# Patient Record
Sex: MALE | Race: WHITE | NOT HISPANIC OR LATINO
[De-identification: names, ages, dates, MRNs, and addresses within clinical notes are randomized per-mention and may not be internally consistent; named-entity substitution may affect disease eponyms.]

---

## 2021-08-18 ENCOUNTER — TRANSCRIPTION ENCOUNTER (OUTPATIENT)
Age: 31
End: 2021-08-18

## 2021-08-18 ENCOUNTER — INPATIENT (INPATIENT)
Facility: HOSPITAL | Age: 31
LOS: 5 days | Discharge: HOME | DRG: 419 | End: 2021-08-24
Attending: INTERNAL MEDICINE | Admitting: INTERNAL MEDICINE
Payer: COMMERCIAL

## 2021-08-18 VITALS
WEIGHT: 184.97 LBS | RESPIRATION RATE: 18 BRPM | DIASTOLIC BLOOD PRESSURE: 91 MMHG | HEIGHT: 69 IN | TEMPERATURE: 98 F | SYSTOLIC BLOOD PRESSURE: 133 MMHG | OXYGEN SATURATION: 97 % | HEART RATE: 51 BPM

## 2021-08-18 DIAGNOSIS — Z29.9 ENCOUNTER FOR PROPHYLACTIC MEASURES, UNSPECIFIED: ICD-10-CM

## 2021-08-18 DIAGNOSIS — S83.512A SPRAIN OF ANTERIOR CRUCIATE LIGAMENT OF LEFT KNEE, INITIAL ENCOUNTER: Chronic | ICD-10-CM

## 2021-08-18 DIAGNOSIS — K80.50 CALCULUS OF BILE DUCT WITHOUT CHOLANGITIS OR CHOLECYSTITIS WITHOUT OBSTRUCTION: ICD-10-CM

## 2021-08-18 DIAGNOSIS — E03.9 HYPOTHYROIDISM, UNSPECIFIED: ICD-10-CM

## 2021-08-18 LAB
ALBUMIN SERPL ELPH-MCNC: 4.1 G/DL — SIGNIFICANT CHANGE UP (ref 3.3–5)
ALP SERPL-CCNC: 168 U/L — HIGH (ref 40–120)
ALT FLD-CCNC: 867 U/L — HIGH (ref 12–78)
ANION GAP SERPL CALC-SCNC: 4 MMOL/L — LOW (ref 5–17)
APAP SERPL-MCNC: 2 UG/ML — LOW (ref 10–30)
APPEARANCE UR: ABNORMAL
APTT BLD: 33.5 SEC — SIGNIFICANT CHANGE UP (ref 27.5–35.5)
AST SERPL-CCNC: 374 U/L — HIGH (ref 15–37)
BASOPHILS # BLD AUTO: 0.03 K/UL — SIGNIFICANT CHANGE UP (ref 0–0.2)
BASOPHILS NFR BLD AUTO: 0.4 % — SIGNIFICANT CHANGE UP (ref 0–2)
BILIRUB DIRECT SERPL-MCNC: 3.4 MG/DL — HIGH (ref 0.05–0.2)
BILIRUB INDIRECT FLD-MCNC: 6.8 MG/DL — HIGH (ref 0.2–1)
BILIRUB SERPL-MCNC: 10.2 MG/DL — HIGH (ref 0.2–1.2)
BILIRUB SERPL-MCNC: 10.2 MG/DL — HIGH (ref 0.2–1.2)
BILIRUB UR-MCNC: ABNORMAL
BUN SERPL-MCNC: 12 MG/DL — SIGNIFICANT CHANGE UP (ref 7–23)
CALCIUM SERPL-MCNC: 9 MG/DL — SIGNIFICANT CHANGE UP (ref 8.5–10.1)
CHLORIDE SERPL-SCNC: 106 MMOL/L — SIGNIFICANT CHANGE UP (ref 96–108)
CO2 SERPL-SCNC: 29 MMOL/L — SIGNIFICANT CHANGE UP (ref 22–31)
COLOR SPEC: YELLOW — SIGNIFICANT CHANGE UP
CREAT SERPL-MCNC: 1.1 MG/DL — SIGNIFICANT CHANGE UP (ref 0.5–1.3)
DIFF PNL FLD: ABNORMAL
EOSINOPHIL # BLD AUTO: 0.27 K/UL — SIGNIFICANT CHANGE UP (ref 0–0.5)
EOSINOPHIL NFR BLD AUTO: 3.7 % — SIGNIFICANT CHANGE UP (ref 0–6)
ETHANOL SERPL-MCNC: <10 MG/DL — SIGNIFICANT CHANGE UP (ref 0–10)
GLUCOSE SERPL-MCNC: 95 MG/DL — SIGNIFICANT CHANGE UP (ref 70–99)
GLUCOSE UR QL: NEGATIVE — SIGNIFICANT CHANGE UP
HCT VFR BLD CALC: 46.2 % — SIGNIFICANT CHANGE UP (ref 39–50)
HGB BLD-MCNC: 15.7 G/DL — SIGNIFICANT CHANGE UP (ref 13–17)
IMM GRANULOCYTES NFR BLD AUTO: 0.4 % — SIGNIFICANT CHANGE UP (ref 0–1.5)
INR BLD: 1.23 RATIO — HIGH (ref 0.88–1.16)
KETONES UR-MCNC: ABNORMAL
LEUKOCYTE ESTERASE UR-ACNC: ABNORMAL
LIDOCAIN IGE QN: 86 U/L — SIGNIFICANT CHANGE UP (ref 73–393)
LYMPHOCYTES # BLD AUTO: 0.79 K/UL — LOW (ref 1–3.3)
LYMPHOCYTES # BLD AUTO: 10.7 % — LOW (ref 13–44)
MCHC RBC-ENTMCNC: 29.8 PG — SIGNIFICANT CHANGE UP (ref 27–34)
MCHC RBC-ENTMCNC: 34 GM/DL — SIGNIFICANT CHANGE UP (ref 32–36)
MCV RBC AUTO: 87.8 FL — SIGNIFICANT CHANGE UP (ref 80–100)
MONOCYTES # BLD AUTO: 0.63 K/UL — SIGNIFICANT CHANGE UP (ref 0–0.9)
MONOCYTES NFR BLD AUTO: 8.6 % — SIGNIFICANT CHANGE UP (ref 2–14)
NEUTROPHILS # BLD AUTO: 5.6 K/UL — SIGNIFICANT CHANGE UP (ref 1.8–7.4)
NEUTROPHILS NFR BLD AUTO: 76.2 % — SIGNIFICANT CHANGE UP (ref 43–77)
NITRITE UR-MCNC: POSITIVE
NRBC # BLD: 0 /100 WBCS — SIGNIFICANT CHANGE UP (ref 0–0)
PH UR: 6 — SIGNIFICANT CHANGE UP (ref 5–8)
PLATELET # BLD AUTO: 199 K/UL — SIGNIFICANT CHANGE UP (ref 150–400)
POTASSIUM SERPL-MCNC: 4.1 MMOL/L — SIGNIFICANT CHANGE UP (ref 3.5–5.3)
POTASSIUM SERPL-SCNC: 4.1 MMOL/L — SIGNIFICANT CHANGE UP (ref 3.5–5.3)
PROT SERPL-MCNC: 7.5 G/DL — SIGNIFICANT CHANGE UP (ref 6–8.3)
PROT UR-MCNC: 15
PROTHROM AB SERPL-ACNC: 14.2 SEC — HIGH (ref 10.6–13.6)
RBC # BLD: 5.26 M/UL — SIGNIFICANT CHANGE UP (ref 4.2–5.8)
RBC # FLD: 12.5 % — SIGNIFICANT CHANGE UP (ref 10.3–14.5)
SARS-COV-2 RNA SPEC QL NAA+PROBE: SIGNIFICANT CHANGE UP
SODIUM SERPL-SCNC: 139 MMOL/L — SIGNIFICANT CHANGE UP (ref 135–145)
SP GR SPEC: 1.02 — SIGNIFICANT CHANGE UP (ref 1.01–1.02)
UROBILINOGEN FLD QL: 8
WBC # BLD: 7.35 K/UL — SIGNIFICANT CHANGE UP (ref 3.8–10.5)
WBC # FLD AUTO: 7.35 K/UL — SIGNIFICANT CHANGE UP (ref 3.8–10.5)

## 2021-08-18 PROCEDURE — 99223 1ST HOSP IP/OBS HIGH 75: CPT | Mod: GC

## 2021-08-18 PROCEDURE — 93010 ELECTROCARDIOGRAM REPORT: CPT

## 2021-08-18 PROCEDURE — 74177 CT ABD & PELVIS W/CONTRAST: CPT | Mod: 26,MA

## 2021-08-18 PROCEDURE — 99222 1ST HOSP IP/OBS MODERATE 55: CPT

## 2021-08-18 PROCEDURE — 99285 EMERGENCY DEPT VISIT HI MDM: CPT

## 2021-08-18 RX ORDER — PIPERACILLIN AND TAZOBACTAM 4; .5 G/20ML; G/20ML
3.38 INJECTION, POWDER, LYOPHILIZED, FOR SOLUTION INTRAVENOUS ONCE
Refills: 0 | Status: DISCONTINUED | OUTPATIENT
Start: 2021-08-18 | End: 2021-08-18

## 2021-08-18 RX ORDER — ONDANSETRON 8 MG/1
4 TABLET, FILM COATED ORAL ONCE
Refills: 0 | Status: COMPLETED | OUTPATIENT
Start: 2021-08-18 | End: 2021-08-18

## 2021-08-18 RX ORDER — SODIUM CHLORIDE 9 MG/ML
1000 INJECTION INTRAMUSCULAR; INTRAVENOUS; SUBCUTANEOUS
Refills: 0 | Status: DISCONTINUED | OUTPATIENT
Start: 2021-08-18 | End: 2021-08-20

## 2021-08-18 RX ORDER — LEVOTHYROXINE SODIUM 125 MCG
112.5 TABLET ORAL DAILY
Refills: 0 | Status: DISCONTINUED | OUTPATIENT
Start: 2021-08-18 | End: 2021-08-23

## 2021-08-18 RX ORDER — LANOLIN ALCOHOL/MO/W.PET/CERES
3 CREAM (GRAM) TOPICAL AT BEDTIME
Refills: 0 | Status: DISCONTINUED | OUTPATIENT
Start: 2021-08-18 | End: 2021-08-23

## 2021-08-18 RX ORDER — ONDANSETRON 8 MG/1
4 TABLET, FILM COATED ORAL EVERY 8 HOURS
Refills: 0 | Status: DISCONTINUED | OUTPATIENT
Start: 2021-08-18 | End: 2021-08-23

## 2021-08-18 RX ORDER — MORPHINE SULFATE 50 MG/1
2 CAPSULE, EXTENDED RELEASE ORAL EVERY 4 HOURS
Refills: 0 | Status: DISCONTINUED | OUTPATIENT
Start: 2021-08-18 | End: 2021-08-23

## 2021-08-18 RX ORDER — PIPERACILLIN AND TAZOBACTAM 4; .5 G/20ML; G/20ML
3.38 INJECTION, POWDER, LYOPHILIZED, FOR SOLUTION INTRAVENOUS EVERY 8 HOURS
Refills: 0 | Status: DISCONTINUED | OUTPATIENT
Start: 2021-08-19 | End: 2021-08-23

## 2021-08-18 RX ORDER — MORPHINE SULFATE 50 MG/1
4 CAPSULE, EXTENDED RELEASE ORAL ONCE
Refills: 0 | Status: DISCONTINUED | OUTPATIENT
Start: 2021-08-18 | End: 2021-08-18

## 2021-08-18 RX ORDER — PIPERACILLIN AND TAZOBACTAM 4; .5 G/20ML; G/20ML
3.38 INJECTION, POWDER, LYOPHILIZED, FOR SOLUTION INTRAVENOUS ONCE
Refills: 0 | Status: COMPLETED | OUTPATIENT
Start: 2021-08-18 | End: 2021-08-18

## 2021-08-18 RX ORDER — ACETAMINOPHEN 500 MG
650 TABLET ORAL EVERY 6 HOURS
Refills: 0 | Status: DISCONTINUED | OUTPATIENT
Start: 2021-08-18 | End: 2021-08-18

## 2021-08-18 RX ORDER — SODIUM CHLORIDE 9 MG/ML
1000 INJECTION INTRAMUSCULAR; INTRAVENOUS; SUBCUTANEOUS ONCE
Refills: 0 | Status: COMPLETED | OUTPATIENT
Start: 2021-08-18 | End: 2021-08-18

## 2021-08-18 RX ORDER — LEVOTHYROXINE SODIUM 125 MCG
1.5 TABLET ORAL
Qty: 0 | Refills: 0 | DISCHARGE

## 2021-08-18 RX ADMIN — PIPERACILLIN AND TAZOBACTAM 25 GRAM(S): 4; .5 INJECTION, POWDER, LYOPHILIZED, FOR SOLUTION INTRAVENOUS at 23:48

## 2021-08-18 RX ADMIN — ONDANSETRON 4 MILLIGRAM(S): 8 TABLET, FILM COATED ORAL at 15:44

## 2021-08-18 RX ADMIN — MORPHINE SULFATE 2 MILLIGRAM(S): 50 CAPSULE, EXTENDED RELEASE ORAL at 21:25

## 2021-08-18 RX ADMIN — MORPHINE SULFATE 4 MILLIGRAM(S): 50 CAPSULE, EXTENDED RELEASE ORAL at 15:44

## 2021-08-18 RX ADMIN — MORPHINE SULFATE 4 MILLIGRAM(S): 50 CAPSULE, EXTENDED RELEASE ORAL at 16:28

## 2021-08-18 RX ADMIN — MORPHINE SULFATE 2 MILLIGRAM(S): 50 CAPSULE, EXTENDED RELEASE ORAL at 21:16

## 2021-08-18 RX ADMIN — SODIUM CHLORIDE 1000 MILLILITER(S): 9 INJECTION INTRAMUSCULAR; INTRAVENOUS; SUBCUTANEOUS at 15:44

## 2021-08-18 RX ADMIN — SODIUM CHLORIDE 125 MILLILITER(S): 9 INJECTION INTRAMUSCULAR; INTRAVENOUS; SUBCUTANEOUS at 21:25

## 2021-08-18 RX ADMIN — PIPERACILLIN AND TAZOBACTAM 3.38 GRAM(S): 4; .5 INJECTION, POWDER, LYOPHILIZED, FOR SOLUTION INTRAVENOUS at 19:25

## 2021-08-18 RX ADMIN — Medication 3 MILLIGRAM(S): at 23:48

## 2021-08-18 RX ADMIN — SODIUM CHLORIDE 1000 MILLILITER(S): 9 INJECTION INTRAMUSCULAR; INTRAVENOUS; SUBCUTANEOUS at 16:44

## 2021-08-18 RX ADMIN — PIPERACILLIN AND TAZOBACTAM 200 GRAM(S): 4; .5 INJECTION, POWDER, LYOPHILIZED, FOR SOLUTION INTRAVENOUS at 18:55

## 2021-08-18 NOTE — H&P ADULT - HISTORY OF PRESENT ILLNESS
Patient is a 32 year old male with PMH of hypothyroidism who presents to the ED with abdominal pain. Patient describes pain as colicky epigastric pain associated with eating greasy foods. Pain originally started 2 months ago and has been present on and off since then. He believed symptoms were due to gas and was taking gas-x for relief. Since Sunday, pain has been radiating to his back. For the past 2 days pain has been progressively worsening.  This morning mother noticed patient looked very jaundiced and brought him to ED for evaluation. He denies any fever, chills, cp, dyspnea, leg pain/swelling. Of note he noticed his urine was much darker yesterday but denies any frequency or dysuria.     In the ED  VS:T98, Hr 51, /91, RR 18, 97% SPO2 on RA  Labs: CBC WNL, INR 1.23, Tbili 10.2, Dbili 3.4, Alk phos 168, AST//867. UA: +nitrite, trace LE, few bacteria  CT A/P: Cholelithiasis and choledocholithiasis. Mildly dilated common bile duct.  EKG: Pending  Given in ED: morphine 4mg IVP x1, zofran 4mg x1, zosyn, NS 1L bolus x1 Patient is a 31 year old male with PMH of hypothyroidism who presents to the ED with abdominal pain. Patient describes pain as colicky epigastric pain associated with eating greasy foods. Pain originally started 2 months ago and has been present on and off since then. He believed symptoms were due to gas and was taking gas-x for relief. Since Sunday, pain has been radiating to his back. For the past 2 days pain has been progressively worsening.  This morning mother noticed patient looked very jaundiced and brought him to Urgent care today for evaluation and then referred to ED. He denies any fever, chills, cp, dyspnea, leg pain/swelling. Of note he noticed his urine was much darker yesterday but denies any frequency or dysuria.   No past hx of DM2/HTN/HLD/CAD/CHF.     In the ED  VS:T98, Hr 51, /91, RR 18, 97% SPO2 on RA  Labs: CBC WNL, INR 1.23, Tbili 10.2, Dbili 3.4, Alk phos 168, AST//867. UA: +nitrite, trace LE, few bacteria  CT A/P: Cholelithiasis and choledocholithiasis. Mildly dilated common bile duct.  EKG: Pending  Given in ED: morphine 4mg IVP x1, zofran 4mg x1, zosyn, NS 1L bolus x1

## 2021-08-18 NOTE — ED ADULT NURSE NOTE - OBJECTIVE STATEMENT
Hypothyroidism Received pt alert and orientated. Pt is complaining of back pain and abd pain. Pt is able to move all extremities. Call bell within reach. Freq rounding performed. Will continue to monitor. Safety/Comfort maintained.

## 2021-08-18 NOTE — PHARMACOTHERAPY INTERVENTION NOTE - COMMENTS
Discontinued Zosyn 3.375g STAT order. Pt already received a loading dose @1855.    POLICY TITLE: Duplicate Orders, Discontinuation by Pharmacy (CPOE)

## 2021-08-18 NOTE — H&P ADULT - NSHPPHYSICALEXAM_GEN_ALL_CORE
T(C): 36.7 (08-18-21 @ 14:32), Max: 36.7 (08-18-21 @ 14:32)  HR: 51 (08-18-21 @ 14:32) (51 - 51)  BP: 133/91 (08-18-21 @ 14:32) (133/91 - 133/91)  RR: 18 (08-18-21 @ 14:32) (18 - 18)  SpO2: 97% (08-18-21 @ 14:32) (97% - 97%)    GENERAL: patient appears well, no acute distress, appropriately interactive  EYES: sclera jaundiced, no exudates  LUNGS: good air entry bilaterally, clear to auscultation, symmetric breath sounds, no wheezing or rhonchi appreciated  HEART: soft S1/S2, regular rate and rhythm, no murmurs noted, no lower extremity edema  GASTROINTESTINAL: abdomen is soft, nontender, nondistended, normoactive bowel sounds  INTEGUMENT: jaundice appearing. good skin turgor, warm skin, appears well perfused  MUSCULOSKELETAL: no clubbing or cyanosis, no obvious deformity  NEUROLOGIC: awake, alert, oriented x3, good muscle tone in all 4 extremities  HEME/LYMPH: no obvious ecchymosis or petechiae T(C): 36.7 (08-18-21 @ 14:32), Max: 36.7 (08-18-21 @ 14:32)  HR: 51 (08-18-21 @ 14:32) (51 - 51)  BP: 133/91 (08-18-21 @ 14:32) (133/91 - 133/91)  RR: 18 (08-18-21 @ 14:32) (18 - 18)  SpO2: 97% (08-18-21 @ 14:32) (97% - 97%)    GENERAL: patient appears well, no acute distress, appropriately interactive  EYES: sclera jaundiced, no exudates  LUNGS: good air entry bilaterally, clear to auscultation, symmetric breath sounds, no wheezing or rhonchi appreciated  HEART: soft S1/S2, regular rate and rhythm, no murmurs noted, no lower extremity edema  GASTROINTESTINAL: abdomen is soft, TTP of RUQ, nondistended, normoactive bowel sounds  INTEGUMENT: jaundice appearing. good skin turgor, warm skin, appears well perfused  MUSCULOSKELETAL: no clubbing or cyanosis, no obvious deformity  NEUROLOGIC: awake, alert, oriented x3, good muscle tone in all 4 extremities  HEME/LYMPH: no obvious ecchymosis or petechiae

## 2021-08-18 NOTE — H&P ADULT - ATTENDING COMMENTS
Patient is a 31 year old male with PMH of hypothyroidism who presents to the ED with colicky epigastric pain and jaundice. Found to have cholelithiasis and choledocholithiasis on CT A/P. Started on Zosyn. Plan for ERCP tomorrow with Dr. Mast    HPI as above.     T(C): 36.7 (08-18-21 @ 14:32), Max: 36.7 (08-18-21 @ 14:32)  HR: 51 (08-18-21 @ 14:32) (51 - 51)  BP: 133/91 (08-18-21 @ 14:32) (133/91 - 133/91)  RR: 18 (08-18-21 @ 14:32) (18 - 18)  SpO2: 97% (08-18-21 @ 14:32) (97% - 97%)  Wt(kg): --    Physical Exam:   GENERAL: well-groomed, well-developed, NAD, jaundiced appearance  HEENT: head NC/AT; EOM intact, +scleral icterus; hearing grossly intact, moist mucous membranes  NECK: supple, no JVD  RESPIRATORY: CTA B/L, no wheezing, rales, rhonchi or rubs  CARDIOVASCULAR: S1&S2, RRR, no murmurs or gallops  ABDOMEN: soft, TTP of RUQ, non-distended, + Bowel sounds x4 quadrants, no guarding, rebound or rigidity  MUSCULOSKELETAL:  no clubbing, cyanosis or edema of all 4 extremities  LYMPH: no cervical lymphadenopathy  VASCULAR: Radial pulses 2+ bilaterally, no varicose veins   SKIN: warm and dry, color normal  NEUROLOGIC: AA&O X3, CN2-12 intact w/ no focal deficits, no sensory loss, motor Strength 5/5 in UE & LE B/L  Psych: Normal mood and affect, normal behavior    Plan:   Choledocholithiasis:   -keep NPO. Conitnue IVF and Zosyn  -patient is afebrile and normal WBC count.   -ER discussed with Dr Mast who advised ERCP to be performed tomorrow  -will trend LFT's  -will likely need cholecystectomy later this admission pending surgery rec's  -remainder as above Patient is a 31 year old male with PMH of hypothyroidism who presents to the ED with colicky epigastric pain and jaundice. Found to have cholelithiasis and choledocholithiasis on CT A/P. Started on Zosyn. Plan for ERCP tomorrow with Dr. Mast    HPI as above.     T(C): 36.7 (08-18-21 @ 14:32), Max: 36.7 (08-18-21 @ 14:32)  HR: 51 (08-18-21 @ 14:32) (51 - 51)  BP: 133/91 (08-18-21 @ 14:32) (133/91 - 133/91)  RR: 18 (08-18-21 @ 14:32) (18 - 18)  SpO2: 97% (08-18-21 @ 14:32) (97% - 97%)  Wt(kg): --    Physical Exam:   GENERAL: well-groomed, well-developed, NAD, jaundiced appearance  HEENT: head NC/AT; EOM intact, +scleral icterus; hearing grossly intact, moist mucous membranes  NECK: supple, no JVD  RESPIRATORY: CTA B/L, no wheezing, rales, rhonchi or rubs  CARDIOVASCULAR: S1&S2, RRR, no murmurs or gallops  ABDOMEN: soft, TTP of RUQ, non-distended, + Bowel sounds x4 quadrants, no guarding, rebound or rigidity  MUSCULOSKELETAL:  no clubbing, cyanosis or edema of all 4 extremities  LYMPH: no cervical lymphadenopathy  VASCULAR: Radial pulses 2+ bilaterally, no varicose veins   SKIN: warm and dry, color normal  NEUROLOGIC: AA&O X3, CN2-12 intact w/ no focal deficits, no sensory loss, motor Strength 5/5 in UE & LE B/L  Psych: Normal mood and affect, normal behavior    Plan:   Choledocholithiasis:   -keep NPO. Conitnue IVF and Zosyn  -patient is afebrile and normal WBC count.   -ER discussed with Dr Mast who advised ERCP to be performed tomorrow  -Transaminitis: 2/2 to CBD stone, obstruction. will trend LFT's  -will likely need cholecystectomy later this admission pending surgery rec's  -remainder as above

## 2021-08-18 NOTE — H&P ADULT - PROBLEM SELECTOR PLAN 1
- Pt presenting with colicky epigastric pain associated with greasy foods, jaundice.   - CT A/P: Cholelithiasis and choledocholithiasis. Mildly dilated common bile duct.  - s/p morphine 4mg IVP x1, zofran 4mg x1, zosyn, NS 1L bolus x1 in ED  - Continue IV zosyn  - NPO  - start NS at 125cc/hr  - pain control with morphine 2mg IVP q4hrs for moderate pain  - continue zofran PRN for nausea  - MRCP ordered, f/u results  - GI Dr. Mast consulted, plan for ERCP tomorrow - Pt presenting with colicky epigastric pain associated with greasy foods, jaundice.   - CT A/P: Cholelithiasis and choledocholithiasis. Mildly dilated common bile duct.  - s/p morphine 4mg IVP x1, zofran 4mg x1, zosyn, NS 1L bolus x1 in ED  - Continue IV zosyn  - trend LFT's  - NPO  - start NS at 125cc/hr  - pain control with morphine 2mg IVP q4hrs for moderate pain  - continue zofran PRN for nausea  - MRCP ordered, f/u results  - GI Dr. Mast consulted, plan for ERCP tomorrow

## 2021-08-18 NOTE — ED PROVIDER NOTE - PROGRESS NOTE DETAILS
spoke with Dr. Hinojosa gi advised ercp tomorrow  spoke with surgical pa will come to see pt   given [facility] admission

## 2021-08-18 NOTE — ED PROVIDER NOTE - ATTENDING CONTRIBUTION TO CARE
30yo M presents with jaundice, pt states he has been havign intermittent abd pain x months, worsening in last few days, associate with nausea, last 2 days noted darkening of urine and jaundice ans scleral icterus,   as well as back pain, no fevers, no chills, no daily etoh use, no ivdu  will check labs, bili, CT ro obstructive pathology   admit

## 2021-08-18 NOTE — ED ADULT TRIAGE NOTE - CHIEF COMPLAINT QUOTE
patient came in ED with c/o bilateral flank pain X yesterday and orange colored urine X yesterday with nausea and vomiting.

## 2021-08-18 NOTE — H&P ADULT - NSHPREVIEWOFSYSTEMS_GEN_ALL_CORE
CONSTITUTIONAL: No weakness, fevers or chills  EYES/ENT: No visual changes;  No vertigo or throat pain   NECK: No pain or stiffness  RESPIRATORY: No cough, wheezing, hemoptysis; No shortness of breath  CARDIOVASCULAR: No chest pain or palpitations  GASTROINTESTINAL: Admits to epigastric pain that radiates to back. No nausea, vomiting, or hematemesis; No diarrhea or constipation. No melena or hematochezia.  GENITOURINARY: No dysuria, frequency or hematuria  NEUROLOGICAL: No numbness or weakness  SKIN: No itching, burning, rashes, or lesions   All other review of systems is negative unless indicated above.

## 2021-08-18 NOTE — H&P ADULT - NSHPSOCIALHISTORY_GEN_ALL_CORE
Lives at home with mother and father. Denies smoking, eoth use. Endorses occasional marijuana smoking. Not sexually active. No hx of STI's. Lives at home with mother and father. Denies smoking. Only social eoth use one time per month. Endorses occasional marijuana smoking. Not sexually active. No hx of STI's.

## 2021-08-18 NOTE — ED ADULT NURSE REASSESSMENT NOTE - NS ED NURSE REASSESS COMMENT FT1
Report received from CAMPBELL ANDERSON.  Patient is pending COVID swab result and room assignment in hospital.

## 2021-08-18 NOTE — H&P ADULT - ASSESSMENT
Patient is a 32 year old male with PMH of hypothyroidism who presents to the ED with colicky epigastric pain and jaundice. Found to have cholelithiasis and choledocholithiasis on CT A/P. Started on Zosyn. Plan for ERCP tomorrow with Dr. Mast Patient is a 31 year old male with PMH of hypothyroidism who presents to the ED with colicky epigastric pain and jaundice. Found to have cholelithiasis and choledocholithiasis on CT A/P. Started on Zosyn. Plan for ERCP tomorrow with Dr. Mast

## 2021-08-18 NOTE — ED PROVIDER NOTE - OBJECTIVE STATEMENT
Pt is a 30 yo male with pmhx of hypothyroidism c/o of upper abdominal pain and vomiting 7 times since yesterday. Pt had chills yesterday back pain no diarrhea. Pt states has been having "indigestion" for about one month worse after he eats heavy foods. Mother noted eyes and skin yellow and brought pt to er. Pt states he is social drinker no other drugs. Pt also noted     pcp Mercedes Pt is a 30 yo male with pmhx of hypothyroidism c/o of upper abdominal pain and vomiting 7 times since yesterday. Pt had chills yesterday back pain no diarrhea. Pt states has been having "indigestion" for about one month worse after he eats heavy foods. Mother noted eyes and skin yellow and brought pt to er. Pt states he is social drinker no other drugs.     pcp Mercedes

## 2021-08-19 LAB
ALBUMIN SERPL ELPH-MCNC: 3.4 G/DL — SIGNIFICANT CHANGE UP (ref 3.3–5)
ALP SERPL-CCNC: 153 U/L — HIGH (ref 40–120)
ALT FLD-CCNC: 603 U/L — HIGH (ref 12–78)
ANION GAP SERPL CALC-SCNC: 7 MMOL/L — SIGNIFICANT CHANGE UP (ref 5–17)
AST SERPL-CCNC: 197 U/L — HIGH (ref 15–37)
BASOPHILS # BLD AUTO: 0.03 K/UL — SIGNIFICANT CHANGE UP (ref 0–0.2)
BASOPHILS NFR BLD AUTO: 0.4 % — SIGNIFICANT CHANGE UP (ref 0–2)
BILIRUB DIRECT SERPL-MCNC: 3.1 MG/DL — HIGH (ref 0.05–0.2)
BILIRUB INDIRECT FLD-MCNC: 5.6 MG/DL — HIGH (ref 0.2–1)
BILIRUB SERPL-MCNC: 8.7 MG/DL — HIGH (ref 0.2–1.2)
BUN SERPL-MCNC: 10 MG/DL — SIGNIFICANT CHANGE UP (ref 7–23)
CALCIUM SERPL-MCNC: 8.5 MG/DL — SIGNIFICANT CHANGE UP (ref 8.5–10.1)
CHLORIDE SERPL-SCNC: 110 MMOL/L — HIGH (ref 96–108)
CO2 SERPL-SCNC: 25 MMOL/L — SIGNIFICANT CHANGE UP (ref 22–31)
COVID-19 SPIKE DOMAIN AB INTERP: POSITIVE
COVID-19 SPIKE DOMAIN ANTIBODY RESULT: >250 U/ML — HIGH
CREAT SERPL-MCNC: 1 MG/DL — SIGNIFICANT CHANGE UP (ref 0.5–1.3)
EOSINOPHIL # BLD AUTO: 0.39 K/UL — SIGNIFICANT CHANGE UP (ref 0–0.5)
EOSINOPHIL NFR BLD AUTO: 5.2 % — SIGNIFICANT CHANGE UP (ref 0–6)
GLUCOSE SERPL-MCNC: 71 MG/DL — SIGNIFICANT CHANGE UP (ref 70–99)
HAV IGM SER-ACNC: SIGNIFICANT CHANGE UP
HBV CORE IGM SER-ACNC: SIGNIFICANT CHANGE UP
HBV SURFACE AG SER-ACNC: SIGNIFICANT CHANGE UP
HCT VFR BLD CALC: 40.8 % — SIGNIFICANT CHANGE UP (ref 39–50)
HCV AB S/CO SERPL IA: 0.09 S/CO — SIGNIFICANT CHANGE UP (ref 0–0.99)
HCV AB SERPL-IMP: SIGNIFICANT CHANGE UP
HGB BLD-MCNC: 13.9 G/DL — SIGNIFICANT CHANGE UP (ref 13–17)
IMM GRANULOCYTES NFR BLD AUTO: 0.5 % — SIGNIFICANT CHANGE UP (ref 0–1.5)
LYMPHOCYTES # BLD AUTO: 1.01 K/UL — SIGNIFICANT CHANGE UP (ref 1–3.3)
LYMPHOCYTES # BLD AUTO: 13.4 % — SIGNIFICANT CHANGE UP (ref 13–44)
MCHC RBC-ENTMCNC: 30.2 PG — SIGNIFICANT CHANGE UP (ref 27–34)
MCHC RBC-ENTMCNC: 34.1 GM/DL — SIGNIFICANT CHANGE UP (ref 32–36)
MCV RBC AUTO: 88.5 FL — SIGNIFICANT CHANGE UP (ref 80–100)
MONOCYTES # BLD AUTO: 0.5 K/UL — SIGNIFICANT CHANGE UP (ref 0–0.9)
MONOCYTES NFR BLD AUTO: 6.6 % — SIGNIFICANT CHANGE UP (ref 2–14)
NEUTROPHILS # BLD AUTO: 5.57 K/UL — SIGNIFICANT CHANGE UP (ref 1.8–7.4)
NEUTROPHILS NFR BLD AUTO: 73.9 % — SIGNIFICANT CHANGE UP (ref 43–77)
NRBC # BLD: 0 /100 WBCS — SIGNIFICANT CHANGE UP (ref 0–0)
PLATELET # BLD AUTO: 164 K/UL — SIGNIFICANT CHANGE UP (ref 150–400)
POTASSIUM SERPL-MCNC: 3.8 MMOL/L — SIGNIFICANT CHANGE UP (ref 3.5–5.3)
POTASSIUM SERPL-SCNC: 3.8 MMOL/L — SIGNIFICANT CHANGE UP (ref 3.5–5.3)
PROT SERPL-MCNC: 6.6 G/DL — SIGNIFICANT CHANGE UP (ref 6–8.3)
RBC # BLD: 4.61 M/UL — SIGNIFICANT CHANGE UP (ref 4.2–5.8)
RBC # FLD: 12.4 % — SIGNIFICANT CHANGE UP (ref 10.3–14.5)
SARS-COV-2 IGG+IGM SERPL QL IA: >250 U/ML — HIGH
SARS-COV-2 IGG+IGM SERPL QL IA: POSITIVE
SODIUM SERPL-SCNC: 142 MMOL/L — SIGNIFICANT CHANGE UP (ref 135–145)
WBC # BLD: 7.54 K/UL — SIGNIFICANT CHANGE UP (ref 3.8–10.5)
WBC # FLD AUTO: 7.54 K/UL — SIGNIFICANT CHANGE UP (ref 3.8–10.5)

## 2021-08-19 PROCEDURE — 99233 SBSQ HOSP IP/OBS HIGH 50: CPT

## 2021-08-19 PROCEDURE — 74181 MRI ABDOMEN W/O CONTRAST: CPT | Mod: 26

## 2021-08-19 PROCEDURE — 99232 SBSQ HOSP IP/OBS MODERATE 35: CPT

## 2021-08-19 RX ORDER — INDOMETHACIN 50 MG
100 CAPSULE ORAL ONCE
Refills: 0 | Status: COMPLETED | OUTPATIENT
Start: 2021-08-19 | End: 2021-08-19

## 2021-08-19 RX ORDER — IBUPROFEN 200 MG
400 TABLET ORAL ONCE
Refills: 0 | Status: DISCONTINUED | OUTPATIENT
Start: 2021-08-19 | End: 2021-08-19

## 2021-08-19 RX ORDER — ONDANSETRON 8 MG/1
4 TABLET, FILM COATED ORAL ONCE
Refills: 0 | Status: DISCONTINUED | OUTPATIENT
Start: 2021-08-19 | End: 2021-08-23

## 2021-08-19 RX ORDER — SODIUM CHLORIDE 9 MG/ML
1000 INJECTION, SOLUTION INTRAVENOUS
Refills: 0 | Status: DISCONTINUED | OUTPATIENT
Start: 2021-08-19 | End: 2021-08-19

## 2021-08-19 RX ORDER — BENZOCAINE AND MENTHOL 5; 1 G/100ML; G/100ML
1 LIQUID ORAL THREE TIMES A DAY
Refills: 0 | Status: DISCONTINUED | OUTPATIENT
Start: 2021-08-19 | End: 2021-08-23

## 2021-08-19 RX ORDER — HYDROMORPHONE HYDROCHLORIDE 2 MG/ML
0.5 INJECTION INTRAMUSCULAR; INTRAVENOUS; SUBCUTANEOUS
Refills: 0 | Status: DISCONTINUED | OUTPATIENT
Start: 2021-08-19 | End: 2021-08-23

## 2021-08-19 RX ADMIN — SODIUM CHLORIDE 75 MILLILITER(S): 9 INJECTION, SOLUTION INTRAVENOUS at 13:01

## 2021-08-19 RX ADMIN — MORPHINE SULFATE 2 MILLIGRAM(S): 50 CAPSULE, EXTENDED RELEASE ORAL at 22:29

## 2021-08-19 RX ADMIN — MORPHINE SULFATE 2 MILLIGRAM(S): 50 CAPSULE, EXTENDED RELEASE ORAL at 15:38

## 2021-08-19 RX ADMIN — MORPHINE SULFATE 2 MILLIGRAM(S): 50 CAPSULE, EXTENDED RELEASE ORAL at 14:42

## 2021-08-19 RX ADMIN — MORPHINE SULFATE 2 MILLIGRAM(S): 50 CAPSULE, EXTENDED RELEASE ORAL at 05:29

## 2021-08-19 RX ADMIN — PIPERACILLIN AND TAZOBACTAM 25 GRAM(S): 4; .5 INJECTION, POWDER, LYOPHILIZED, FOR SOLUTION INTRAVENOUS at 08:13

## 2021-08-19 RX ADMIN — PIPERACILLIN AND TAZOBACTAM 25 GRAM(S): 4; .5 INJECTION, POWDER, LYOPHILIZED, FOR SOLUTION INTRAVENOUS at 14:34

## 2021-08-19 RX ADMIN — PIPERACILLIN AND TAZOBACTAM 25 GRAM(S): 4; .5 INJECTION, POWDER, LYOPHILIZED, FOR SOLUTION INTRAVENOUS at 21:46

## 2021-08-19 RX ADMIN — SODIUM CHLORIDE 125 MILLILITER(S): 9 INJECTION INTRAMUSCULAR; INTRAVENOUS; SUBCUTANEOUS at 18:43

## 2021-08-19 RX ADMIN — Medication 112.5 MICROGRAM(S): at 05:21

## 2021-08-19 RX ADMIN — MORPHINE SULFATE 2 MILLIGRAM(S): 50 CAPSULE, EXTENDED RELEASE ORAL at 06:42

## 2021-08-19 RX ADMIN — MORPHINE SULFATE 2 MILLIGRAM(S): 50 CAPSULE, EXTENDED RELEASE ORAL at 23:00

## 2021-08-19 NOTE — PROGRESS NOTE ADULT - PROBLEM SELECTOR PLAN 1
+ U/A- possible send Urine culture- clean catch  MRCP to be performed today, will follow up  Continue Antibiotics  Continue ambulation, anti emetics prn, pain medication prn  Awaiting results of MRCP for GI recs.   Will discuss with Dr. Kaplan.

## 2021-08-19 NOTE — PROGRESS NOTE ADULT - SUBJECTIVE AND OBJECTIVE BOX
Patient is a 31y old  Male who presents with a chief complaint of Choledocholithiasis (19 Aug 2021 09:15)      INTERVAL HPI/OVERNIGHT EVENTS:    MEDICATIONS  (STANDING):  levothyroxine 112.5 MICROGram(s) Oral daily  piperacillin/tazobactam IVPB.. 3.375 Gram(s) IV Intermittent every 8 hours  sodium chloride 0.9%. 1000 milliLiter(s) (125 mL/Hr) IV Continuous <Continuous>    MEDICATIONS  (PRN):  melatonin 3 milliGRAM(s) Oral at bedtime PRN Insomnia  morphine  - Injectable 2 milliGRAM(s) IV Push every 4 hours PRN Moderate Pain (4 - 6)  ondansetron Injectable 4 milliGRAM(s) IV Push every 8 hours PRN Nausea and/or Vomiting      Allergies    No Known Allergies    Intolerances        REVIEW OF SYSTEMS:  CONSTITUTIONAL: No fever or chills  HEENT:  No headache, no sore throat  RESPIRATORY: No cough, wheezing, or shortness of breath  CARDIOVASCULAR: No chest pain, palpitations, or leg swelling  GASTROINTESTINAL: No abd pain, nausea, vomiting, or diarrhea  GENITOURINARY: No dysuria, frequency, or hematuria  NEUROLOGICAL: no focal weakness or dizziness  MUSCULOSKELETAL: no myalgias     Vital Signs Last 24 Hrs  T(C): 36.8 (19 Aug 2021 10:15), Max: 36.8 (19 Aug 2021 10:09)  T(F): 98.2 (19 Aug 2021 10:15), Max: 98.2 (19 Aug 2021 10:09)  HR: 49 (19 Aug 2021 10:15) (47 - 58)  BP: 91/53 (19 Aug 2021 10:15) (91/53 - 133/91)  BP(mean): --  RR: 16 (19 Aug 2021 10:15) (16 - 18)  SpO2: 96% (19 Aug 2021 10:15) (96% - 97%)    PHYSICAL EXAM:  GENERAL: NAD  HEENT:  EOMI, moist mucous membranes  CHEST/LUNG:  CTA b/l, no rales, wheezes, or rhonchi  HEART:  RRR, S1, S2  ABDOMEN:  BS+, soft, nontender, nondistended  EXTREMITIES: no edema, cyanosis, or calf tenderness  NERVOUS SYSTEM: AA&Ox3    LABS:                        13.9   7.54  )-----------( 164      ( 19 Aug 2021 07:20 )             40.8     CBC Full  -  ( 19 Aug 2021 07:20 )  WBC Count : 7.54 K/uL  Hemoglobin : 13.9 g/dL  Hematocrit : 40.8 %  Platelet Count - Automated : 164 K/uL  Mean Cell Volume : 88.5 fl  Mean Cell Hemoglobin : 30.2 pg  Mean Cell Hemoglobin Concentration : 34.1 gm/dL  Auto Neutrophil # : 5.57 K/uL  Auto Lymphocyte # : 1.01 K/uL  Auto Monocyte # : 0.50 K/uL  Auto Eosinophil # : 0.39 K/uL  Auto Basophil # : 0.03 K/uL  Auto Neutrophil % : 73.9 %  Auto Lymphocyte % : 13.4 %  Auto Monocyte % : 6.6 %  Auto Eosinophil % : 5.2 %  Auto Basophil % : 0.4 %    19 Aug 2021 07:20    142    |  110    |  10     ----------------------------<  71     3.8     |  25     |  1.00     Ca    8.5        19 Aug 2021 07:20    TPro  6.6    /  Alb  3.4    /  TBili  8.7    /  DBili  3.10   /  AST  197    /  ALT  603    /  AlkPhos  153    19 Aug 2021 07:20    PT/INR - ( 18 Aug 2021 15:52 )   PT: 14.2 sec;   INR: 1.23 ratio         PTT - ( 18 Aug 2021 15:52 )  PTT:33.5 sec  Urinalysis Basic - ( 18 Aug 2021 16:27 )    Color: Nahed / Appearance: Slightly Turbid / S.020 / pH: x  Gluc: x / Ketone: Trace  / Bili: Large / Urobili: 8   Blood: x / Protein: 15 / Nitrite: Positive   Leuk Esterase: Trace / RBC: 3-5 /HPF / WBC 3-5   Sq Epi: x / Non Sq Epi: x / Bacteria: Few      CAPILLARY BLOOD GLUCOSE              RADIOLOGY & ADDITIONAL TESTS:    Personally reviewed.     Consultant(s) Notes Reviewed:  [x] YES  [ ] NO    Care Discussed with [x] Consultants  [x] Patient  [ ] Family  [ ]      [ ] Other; RN  DVT ppx   Patient is a 31y old  Male who presents with a chief complaint of Choledocholithiasis (19 Aug 2021 09:15)      INTERVAL HPI/OVERNIGHT EVENTS: Pt seen and examined at bedside. Pt admits mild sore throat and LBP s/p ERCP. no fevers, chills. Tolerating CLD well.     MEDICATIONS  (STANDING):  levothyroxine 112.5 MICROGram(s) Oral daily  piperacillin/tazobactam IVPB.. 3.375 Gram(s) IV Intermittent every 8 hours  sodium chloride 0.9%. 1000 milliLiter(s) (125 mL/Hr) IV Continuous <Continuous>    MEDICATIONS  (PRN):  melatonin 3 milliGRAM(s) Oral at bedtime PRN Insomnia  morphine  - Injectable 2 milliGRAM(s) IV Push every 4 hours PRN Moderate Pain (4 - 6)  ondansetron Injectable 4 milliGRAM(s) IV Push every 8 hours PRN Nausea and/or Vomiting      Allergies    No Known Allergies    Intolerances        REVIEW OF SYSTEMS:  CONSTITUTIONAL: No fever or chills  HEENT:  No headache, no sore throat  RESPIRATORY: No cough, wheezing, or shortness of breath  CARDIOVASCULAR: No chest pain, palpitations, or leg swelling  GASTROINTESTINAL: No abd pain, nausea, vomiting, or diarrhea  GENITOURINARY: No dysuria, frequency, or hematuria  NEUROLOGICAL: no focal weakness or dizziness  MUSCULOSKELETAL: no myalgias     Vital Signs Last 24 Hrs  T(C): 36.8 (19 Aug 2021 10:15), Max: 36.8 (19 Aug 2021 10:09)  T(F): 98.2 (19 Aug 2021 10:15), Max: 98.2 (19 Aug 2021 10:09)  HR: 49 (19 Aug 2021 10:15) (47 - 58)  BP: 91/53 (19 Aug 2021 10:15) (91/53 - 133/91)  BP(mean): --  RR: 16 (19 Aug 2021 10:15) (16 - 18)  SpO2: 96% (19 Aug 2021 10:15) (96% - 97%)    PHYSICAL EXAM:  GENERAL: M in NAD  HEENT:  EOMI, moist mucous membranes  CHEST/LUNG:  CTA b/l, no rales, wheezes, or rhonchi  HEART:  RRR, S1, S2  ABDOMEN:  BS+, soft, nontender, nondistended  EXTREMITIES: no edema, cyanosis, or calf tenderness  NERVOUS SYSTEM: AA&Ox3    LABS:                        13.9   7.54  )-----------( 164      ( 19 Aug 2021 07:20 )             40.8     CBC Full  -  ( 19 Aug 2021 07:20 )  WBC Count : 7.54 K/uL  Hemoglobin : 13.9 g/dL  Hematocrit : 40.8 %  Platelet Count - Automated : 164 K/uL  Mean Cell Volume : 88.5 fl  Mean Cell Hemoglobin : 30.2 pg  Mean Cell Hemoglobin Concentration : 34.1 gm/dL  Auto Neutrophil # : 5.57 K/uL  Auto Lymphocyte # : 1.01 K/uL  Auto Monocyte # : 0.50 K/uL  Auto Eosinophil # : 0.39 K/uL  Auto Basophil # : 0.03 K/uL  Auto Neutrophil % : 73.9 %  Auto Lymphocyte % : 13.4 %  Auto Monocyte % : 6.6 %  Auto Eosinophil % : 5.2 %  Auto Basophil % : 0.4 %    19 Aug 2021 07:20    142    |  110    |  10     ----------------------------<  71     3.8     |  25     |  1.00     Ca    8.5        19 Aug 2021 07:20    TPro  6.6    /  Alb  3.4    /  TBili  8.7    /  DBili  3.10   /  AST  197    /  ALT  603    /  AlkPhos  153    19 Aug 2021 07:20    PT/INR - ( 18 Aug 2021 15:52 )   PT: 14.2 sec;   INR: 1.23 ratio         PTT - ( 18 Aug 2021 15:52 )  PTT:33.5 sec  Urinalysis Basic - ( 18 Aug 2021 16:27 )    Color: Nahed / Appearance: Slightly Turbid / S.020 / pH: x  Gluc: x / Ketone: Trace  / Bili: Large / Urobili: 8   Blood: x / Protein: 15 / Nitrite: Positive   Leuk Esterase: Trace / RBC: 3-5 /HPF / WBC 3-5   Sq Epi: x / Non Sq Epi: x / Bacteria: Few      CAPILLARY BLOOD GLUCOSE              RADIOLOGY & ADDITIONAL TESTS:    Personally reviewed.     Consultant(s) Notes Reviewed:  [x] YES  [ ] NO    Care Discussed with [x] Consultants  [x] Patient  [ ] Family  [ ]      [ ] Other; RN  DVT ppx

## 2021-08-19 NOTE — PROGRESS NOTE ADULT - PROBLEM SELECTOR PLAN 1
- Pt presenting with colicky epigastric pain associated with greasy foods, jaundice.   - CT A/P: Cholelithiasis and choledocholithiasis. Mildly dilated common bile duct.  - s/p morphine 4mg IVP x1, zofran 4mg x1, zosyn, NS 1L bolus x1 in ED  - Continue IV zosyn  - trend LFT's  - NPO  - start NS at 125cc/hr  - pain control with morphine 2mg IVP q4hrs for moderate pain  - continue zofran PRN for nausea  - MRCP ordered, f/u results  - GI Dr. Mast consulted, plan for ERCP tomorrow - Pt presenting with colicky epigastric pain associated with greasy foods, jaundice.   - CT A/P: Cholelithiasis and choledocholithiasis. Mildly dilated common bile duct.  - s/p morphine 4mg IVP x1, zofran 4mg x1, zosyn, NS 1L bolus x1 in ED  - Continue IV zosyn  - trend LFT's  - NPO  - start NS at 125cc/hr  - pain control with morphine 2mg IVP q4hrs for moderate pain  - continue zofran PRN for nausea  - s/p ERCP, multiple small stones removed -- may need eventual lap ellen per surgery, tolerating CLD well, Advance as tolerated per surgery

## 2021-08-19 NOTE — CONSULT NOTE ADULT - SUBJECTIVE AND OBJECTIVE BOX
[facility]  Isacc Cason PA-C  [facility] [place] [address]  [phone]      Chief Complaint:  Patient is a 31y old  Male who presents with a chief complaint of Choledocholithiasis (19 Aug 2021 11:04)      HPI:Patient is a 31 year old male with PMH of hypothyroidism who presents to the ED with abdominal pain. Patient describes pain as colicky epigastric pain associated with eating greasy foods. Pain originally started 2 months ago and has been present on and off since then. He believed symptoms were due to gas and was taking gas-x for relief. Since , pain has been radiating to his back. For the past 2 days pain has been progressively worsening.  This morning mother noticed patient looked very jaundiced and brought him to Urgent care today for evaluation and then referred to ED. He denies any fever, chills, cp, dyspnea, leg pain/swelling. Of note he noticed his urine was much darker yesterday but denies any frequency or dysuria.   No past hx of DM2/HTN/HLD/CAD/CHF.     Allergies:  No Known Allergies      Medications:  levothyroxine 112.5 MICROGram(s) Oral daily  melatonin 3 milliGRAM(s) Oral at bedtime PRN  morphine  - Injectable 2 milliGRAM(s) IV Push every 4 hours PRN  ondansetron Injectable 4 milliGRAM(s) IV Push every 8 hours PRN  piperacillin/tazobactam IVPB.. 3.375 Gram(s) IV Intermittent every 8 hours  sodium chloride 0.9%. 1000 milliLiter(s) IV Continuous <Continuous>      PMHX/PSHX:  Hypothyroid    Left ACL tear        Family history:  Family history of breast cancer in mother (Mother)        Social History:     ROS:     General:  No wt loss, fevers, chills, night sweats, fatigue,   Eyes:  Good vision, no reported pain  ENT:  No sore throat, pain, runny nose, dysphagia  CV:  No pain, palpitations, hypo/hypertension  Resp:  No dyspnea, cough, tachypnea, wheezing  GI:  No pain, No nausea, No vomiting, No diarrhea, No constipation, No weight loss, No fever, No pruritis, No rectal bleeding, No tarry stools, No dysphagia,  :  No pain, bleeding, incontinence, nocturia  Muscle:  No pain, weakness  Neuro:  No weakness, tingling, memory problems  Psych:  No fatigue, insomnia, mood problems, depression  Endocrine:  No polyuria, polydipsia, cold/heat intolerance  Heme:  No petechiae, ecchymosis, easy bruisability  Skin:  No rash, tattoos, scars, edema      PHYSICAL EXAM:   Vital Signs:  Vital Signs Last 24 Hrs  T(C): 36.8 (19 Aug 2021 10:15), Max: 36.8 (19 Aug 2021 10:09)  T(F): 98.2 (19 Aug 2021 10:15), Max: 98.2 (19 Aug 2021 10:09)  HR: 49 (19 Aug 2021 10:15) (47 - 58)  BP: 91/53 (19 Aug 2021 10:15) (91/53 - 133/91)  BP(mean): --  RR: 16 (19 Aug 2021 10:15) (16 - 18)  SpO2: 96% (19 Aug 2021 10:15) (96% - 97%)  Daily Height in cm: 175.3 (19 Aug 2021 10:09)    Daily Weight in k.6 (19 Aug 2021 06:36)    GENERAL:  Appears stated age,   HEENT:  NC/AT,    CHEST:  Full & symmetric excursion,   HEART:  Regular rhythm  ABDOMEN:  Soft, non-tender, non-distended,   EXTEREMITIES:  no cyanosis,clubbing or edema  SKIN:  No rash  NEURO:  Alert,    LABS:                        13.9   7.54  )-----------( 164      ( 19 Aug 2021 07:20 )             40.8     08-19    142  |  110<H>  |  10  ----------------------------<  71  3.8   |  25  |  1.00    Ca    8.5      19 Aug 2021 07:20    TPro  6.6  /  Alb  3.4  /  TBili  8.7<H>  /  DBili  3.10<H>  /  AST  197<H>  /  ALT  603<H>  /  AlkPhos  153<H>  08    LIVER FUNCTIONS - ( 19 Aug 2021 07:20 )  Alb: 3.4 g/dL / Pro: 6.6 g/dL / ALK PHOS: 153 U/L / ALT: 603 U/L / AST: 197 U/L / GGT: x           PT/INR - ( 18 Aug 2021 15:52 )   PT: 14.2 sec;   INR: 1.23 ratio         PTT - ( 18 Aug 2021 15:52 )  PTT:33.5 sec  Urinalysis Basic - ( 18 Aug 2021 16:27 )    Color: Nahed / Appearance: Slightly Turbid / S.020 / pH: x  Gluc: x / Ketone: Trace  / Bili: Large / Urobili: 8   Blood: x / Protein: 15 / Nitrite: Positive   Leuk Esterase: Trace / RBC: 3-5 /HPF / WBC 3-5   Sq Epi: x / Non Sq Epi: x / Bacteria: Few      Amylase Serum--      Lipase serum86       Ammonia--      Imaging:          
30 y/o M pmhx hypothyroidism presents to [facility] c/o upper abdominal/epigastric abdominal pain radiating to the back. Pt admits to associated with N/V, with onset of abdominal pain. Pt states he has had a similar episode of abdominal pain about a month ago which resolved on its own. At this time pt c/o continued abdominal pain however without N/V. Pt states he had chills at home however did not check temperature. PT denies chest pain, SOB, palpitations, fevers , melena or hematochezia.     PAST MEDICAL & SURGICAL HISTORY:  Hypothyroid      Allergies:  No Known Allergies    Home Medications:      Family History:  FAMILY HISTORY:      ROS:  Constitutional: Denies fever, fatigue or weight loss.  Skin: Denies rash.  Eyes: Denies recent vision problems or eye pain.  ENT: Denies congestion, ear pain, or sore throat.  Endocrine: Denies thyroid problems.  Cardiovascular: Denies chest pain or palpation.  Respiratory: Denies cough, shortness of breath, congestion, or wheezing.  Gastrointestinal: SEE HPI  Genitourinary: Denies dysuria.  Musculoskeletal: Denies joint swelling.  Neurologic: Denies headache.      Physical Examination:  GENERAL: No acute distress, well-developed  HEAD:  Atraumatic, Normocephalic, jaundice sclera   CHEST/LUNG: CTABL, no ronchi, rales or wheezing  HEART: RRR, no MRGs  ABDOMEN: Soft, nonttp, nondistended, +bs  NEUROLOGY: A&O x 3, no focal deficits    Data:                        15.7   7.35  )-----------( 199      ( 18 Aug 2021 15:52 )             46.2         139  |  106  |  12  ----------------------------<  95  4.1   |  29  |  1.10    Ca    9.0      18 Aug 2021 15:52    TPro  7.5  /  Alb  4.1  /  TBili  10.2<H>  /  DBili  3.40<H>  /  AST  374<H>  /  ALT  867<H>  /  AlkPhos  168<H>        LIVER FUNCTIONS - ( 18 Aug 2021 15:52 )  Alb: 4.1 g/dL / Pro: 7.5 g/dL / ALK PHOS: 168 U/L / ALT: 867 U/L / AST: 374 U/L / GGT: x           Urinalysis Basic - ( 18 Aug 2021 16:27 )    Color: Nahed / Appearance: Slightly Turbid / S.020 / pH: x  Gluc: x / Ketone: Trace  / Bili: Large / Urobili: 8   Blood: x / Protein: 15 / Nitrite: Positive   Leuk Esterase: Trace / RBC: 3-5 /HPF / WBC 3-5   Sq Epi: x / Non Sq Epi: x / Bacteria: Few    Radiology:  < from: CT Abdomen and Pelvis w/ IV Cont (21 @ 18:19) >    EXAM:  CT ABDOMEN AND PELVIS IC                            PROCEDURE DATE:  2021          INTERPRETATION:  CLINICAL INFORMATION: jaundice vomiting flank pain    COMPARISON: None.    CONTRAST/COMPLICATIONS:  IV Contrast: 90 cc administered   10 cc discarded  Oral Contrast:  Complications: NONE    PROCEDURE:  CT of the Abdomen and Pelvis was performed.  Sagittal and coronal reformats were performed.    FINDINGS:    LOWER CHEST: Within normal limits.    LIVER: Subcentimeter lesions too smallto characterize.  BILE DUCTS: Small layering calculi in the distal CBD with mild CBD dilatation. No intrahepatic biliary dilatation.  GALLBLADDER: Cholelithiasis.  SPLEEN: Within normal limits.  PANCREAS: Within normal limits.  ADRENALS: Within normal limits.  KIDNEYS/URETERS: Within normal limits.    BLADDER: Within normal limits.  REPRODUCTIVE ORGANS: Within normal limits.    BOWEL: No bowel obstruction. The appendix is normal.  PERITONEUM: No ascites.  VESSELS:  Within normal limits.  RETROPERITONEUM/LYMPH NODES: No lymphadenopathy.  ABDOMINAL WALL: Within normal limits.  BONES: Within normal limits.    IMPRESSION: Cholelithiasis and choledocholithiasis. Mildly dilated common bile duct.        --- End of Report ---  SORAYA SMITH MD; Attending Radiologist  This document has been electronically signed. Aug 18 2021  6:31PM    < end of copied text >    Assessment:   30 y/o M pmhx hypothyroidism, presenting with 1 day hx of epigastric abdominal pain with tbili of 10.2, ast/alt, 374/867, CT abd/pelv findings consistent with cholelithiasis, choledocholithiasis and mildly common bile duct,     Plan:  - medical admission  - rec GI consult, may require ERCP   - IV zosyn   - zofran prn for nausea  - pain control, supportive care  - following, will benefit from eventual completion cholecystectomy once CBD cleared  - trend lfts  - Dr. Kaplan aware

## 2021-08-19 NOTE — CONSULT NOTE ADULT - ASSESSMENT
common bile duct stone  elevated lfts  abnormal CT     ct documented cbd stone  no need to wait for MRI result  npo  cont antibiotics   plan for ercp today  risks/benefits/alternatives including but not limited to bleeding, infection, perforation or injury requiring surgery all discussed  also chance of causing post ercp pancreatitis (upwards of 5-10%) discussed  chance of needing biliary or pancreatic stent also discussed which would necessitate future procedures also discussed  patient understands and agrees to proceed with procedure

## 2021-08-19 NOTE — PROGRESS NOTE ADULT - ASSESSMENT
30 yo male here for cholelithiasis, choledocholithiasis, with + UA, awaiting MRCP today. Improvement in LFT's today, but still elevated.  30 yo male here for cholelithiasis, choledocholithiasis, with + UA, awaiting MRCP today. Improvement in LFT's today, but still elevated.   Will await ercp results, before making any surgical decisions.

## 2021-08-19 NOTE — PROGRESS NOTE ADULT - SUBJECTIVE AND OBJECTIVE BOX
Hospital day:     31y Male admitted with Calculus of bile duct without obstruction, cholangitis, or cholecystitis      Pt without complaints. States his abdomen feels well.  Denies N/V, urinary symptoms, fevers, headaches, chills.  No overnight events.     T(F): 98.1 (08-19-21 @ 05:03), Max: 98.1 (08-19-21 @ 05:03)  HR: 58 (08-19-21 @ 05:32) (47 - 58)  BP: 112/74 (08-19-21 @ 05:32) (100/63 - 133/91)  RR: 16 (08-19-21 @ 05:03) (16 - 18)  SpO2: 97% (08-19-21 @ 05:03) (96% - 97%)  Wt(kg): --  CAPILLARY BLOOD GLUCOSE          PHYSICAL EXAM:  General: NAD  Neuro:  Alert & oriented x 3  Abdomen: BS+ Soft, NT, ND. NO RUQ discomfort.   Extremities: no pedal edema or calf tenderness noted       LABS:                        13.9   7.54  )-----------( 164      ( 19 Aug 2021 07:20 )             40.8     08-19    142  |  110<H>  |  10  ----------------------------<  71  3.8   |  25  |  1.00    Ca    8.5      19 Aug 2021 07:20    TPro  6.6  /  Alb  3.4  /  TBili  8.7<H>  /  DBili  3.10<H>  /  AST  197<H>  /  ALT  603<H>  /  AlkPhos  153<H>  08-19    PT/INR - ( 18 Aug 2021 15:52 )   PT: 14.2 sec;   INR: 1.23 ratio         PTT - ( 18 Aug 2021 15:52 )  PTT:33.5 sec  I&O's Detail        RADIOLOGY:

## 2021-08-19 NOTE — PROGRESS NOTE ADULT - ASSESSMENT
Patient is a 31 year old male with PMH of hypothyroidism who presents to the ED with colicky epigastric pain and jaundice. Found to have cholelithiasis and choledocholithiasis on CT A/P. Started on Zosyn. Plan for ERCP tomorrow with Dr. Mast

## 2021-08-19 NOTE — PROGRESS NOTE ADULT - SUBJECTIVE AND OBJECTIVE BOX
s/p ercp    multiple small stones removed from cbd    rec:   Advance diet as tolerated  iv fluid  surgery f/u re: ccy  monitor lfts

## 2021-08-20 LAB
ALBUMIN SERPL ELPH-MCNC: 3 G/DL — LOW (ref 3.3–5)
ALP SERPL-CCNC: 127 U/L — HIGH (ref 40–120)
ALT FLD-CCNC: 386 U/L — HIGH (ref 12–78)
AMYLASE P1 CFR SERPL: 39 U/L — SIGNIFICANT CHANGE UP (ref 25–125)
ANION GAP SERPL CALC-SCNC: 3 MMOL/L — LOW (ref 5–17)
AST SERPL-CCNC: 79 U/L — HIGH (ref 15–37)
BASOPHILS # BLD AUTO: 0.03 K/UL — SIGNIFICANT CHANGE UP (ref 0–0.2)
BASOPHILS NFR BLD AUTO: 0.6 % — SIGNIFICANT CHANGE UP (ref 0–2)
BILIRUB SERPL-MCNC: 3.6 MG/DL — HIGH (ref 0.2–1.2)
BUN SERPL-MCNC: 7 MG/DL — SIGNIFICANT CHANGE UP (ref 7–23)
CALCIUM SERPL-MCNC: 8.2 MG/DL — LOW (ref 8.5–10.1)
CHLORIDE SERPL-SCNC: 112 MMOL/L — HIGH (ref 96–108)
CO2 SERPL-SCNC: 29 MMOL/L — SIGNIFICANT CHANGE UP (ref 22–31)
CREAT SERPL-MCNC: 0.96 MG/DL — SIGNIFICANT CHANGE UP (ref 0.5–1.3)
CULTURE RESULTS: NO GROWTH — SIGNIFICANT CHANGE UP
EOSINOPHIL # BLD AUTO: 0.32 K/UL — SIGNIFICANT CHANGE UP (ref 0–0.5)
EOSINOPHIL NFR BLD AUTO: 6.3 % — HIGH (ref 0–6)
GLUCOSE SERPL-MCNC: 95 MG/DL — SIGNIFICANT CHANGE UP (ref 70–99)
HCT VFR BLD CALC: 38 % — LOW (ref 39–50)
HGB BLD-MCNC: 12.8 G/DL — LOW (ref 13–17)
IMM GRANULOCYTES NFR BLD AUTO: 0.4 % — SIGNIFICANT CHANGE UP (ref 0–1.5)
LIDOCAIN IGE QN: 59 U/L — LOW (ref 73–393)
LYMPHOCYTES # BLD AUTO: 1.03 K/UL — SIGNIFICANT CHANGE UP (ref 1–3.3)
LYMPHOCYTES # BLD AUTO: 20.3 % — SIGNIFICANT CHANGE UP (ref 13–44)
MAGNESIUM SERPL-MCNC: 2.1 MG/DL — SIGNIFICANT CHANGE UP (ref 1.6–2.6)
MCHC RBC-ENTMCNC: 29.8 PG — SIGNIFICANT CHANGE UP (ref 27–34)
MCHC RBC-ENTMCNC: 33.7 GM/DL — SIGNIFICANT CHANGE UP (ref 32–36)
MCV RBC AUTO: 88.4 FL — SIGNIFICANT CHANGE UP (ref 80–100)
MONOCYTES # BLD AUTO: 0.42 K/UL — SIGNIFICANT CHANGE UP (ref 0–0.9)
MONOCYTES NFR BLD AUTO: 8.3 % — SIGNIFICANT CHANGE UP (ref 2–14)
NEUTROPHILS # BLD AUTO: 3.25 K/UL — SIGNIFICANT CHANGE UP (ref 1.8–7.4)
NEUTROPHILS NFR BLD AUTO: 64.1 % — SIGNIFICANT CHANGE UP (ref 43–77)
NRBC # BLD: 0 /100 WBCS — SIGNIFICANT CHANGE UP (ref 0–0)
PHOSPHATE SERPL-MCNC: 2.5 MG/DL — SIGNIFICANT CHANGE UP (ref 2.5–4.5)
PLATELET # BLD AUTO: 159 K/UL — SIGNIFICANT CHANGE UP (ref 150–400)
POTASSIUM SERPL-MCNC: 4.1 MMOL/L — SIGNIFICANT CHANGE UP (ref 3.5–5.3)
POTASSIUM SERPL-SCNC: 4.1 MMOL/L — SIGNIFICANT CHANGE UP (ref 3.5–5.3)
PROT SERPL-MCNC: 6.1 G/DL — SIGNIFICANT CHANGE UP (ref 6–8.3)
RBC # BLD: 4.3 M/UL — SIGNIFICANT CHANGE UP (ref 4.2–5.8)
RBC # FLD: 12.3 % — SIGNIFICANT CHANGE UP (ref 10.3–14.5)
SODIUM SERPL-SCNC: 144 MMOL/L — SIGNIFICANT CHANGE UP (ref 135–145)
SPECIMEN SOURCE: SIGNIFICANT CHANGE UP
WBC # BLD: 5.07 K/UL — SIGNIFICANT CHANGE UP (ref 3.8–10.5)
WBC # FLD AUTO: 5.07 K/UL — SIGNIFICANT CHANGE UP (ref 3.8–10.5)

## 2021-08-20 PROCEDURE — 99232 SBSQ HOSP IP/OBS MODERATE 35: CPT

## 2021-08-20 PROCEDURE — 99233 SBSQ HOSP IP/OBS HIGH 50: CPT

## 2021-08-20 RX ADMIN — PIPERACILLIN AND TAZOBACTAM 25 GRAM(S): 4; .5 INJECTION, POWDER, LYOPHILIZED, FOR SOLUTION INTRAVENOUS at 22:51

## 2021-08-20 RX ADMIN — SODIUM CHLORIDE 125 MILLILITER(S): 9 INJECTION INTRAMUSCULAR; INTRAVENOUS; SUBCUTANEOUS at 10:28

## 2021-08-20 RX ADMIN — PIPERACILLIN AND TAZOBACTAM 25 GRAM(S): 4; .5 INJECTION, POWDER, LYOPHILIZED, FOR SOLUTION INTRAVENOUS at 05:35

## 2021-08-20 RX ADMIN — Medication 112.5 MICROGRAM(S): at 05:34

## 2021-08-20 RX ADMIN — PIPERACILLIN AND TAZOBACTAM 25 GRAM(S): 4; .5 INJECTION, POWDER, LYOPHILIZED, FOR SOLUTION INTRAVENOUS at 14:18

## 2021-08-20 NOTE — PROGRESS NOTE ADULT - PROBLEM SELECTOR PLAN 1
- Pt presenting with colicky epigastric pain associated with greasy foods, jaundice.   - CT A/P: Cholelithiasis and choledocholithiasis. Mildly dilated common bile duct.  - s/p morphine 4mg IVP x1, zofran 4mg x1, zosyn, NS 1L bolus x1 in ED  - Continue IV zosyn  - trend LFT's  - low fat diet -- tolerated well  - pain control with morphine 2mg IVP q4hrs for moderate pain  - continue zofran PRN for nausea  - s/p ERCP, multiple small stones removed -- may need eventual lap ellen per surgery -- plan for MONDAY tolerating CLD well, Advance as tolerated per surgery TO LOW FAT DIET

## 2021-08-20 NOTE — PROGRESS NOTE ADULT - SUBJECTIVE AND OBJECTIVE BOX
[facility]  Isacc Cason PA-C  [address] [place] [address]  [phone]      INTERVAL HPI/OVERNIGHT EVENTS:  Pt s/e s/p ERCP  Pt states he feels well, denies pain, nausea, or further GI complaints    MEDICATIONS  (STANDING):  levothyroxine 112.5 MICROGram(s) Oral daily  piperacillin/tazobactam IVPB.. 3.375 Gram(s) IV Intermittent every 8 hours  sodium chloride 0.9%. 1000 milliLiter(s) (125 mL/Hr) IV Continuous <Continuous>    MEDICATIONS  (PRN):  benzocaine 15 mG/menthol 3.6 mG (Sugar-Free) Lozenge 1 Lozenge Oral three times a day PRN Sore Throat  HYDROmorphone  Injectable 0.5 milliGRAM(s) IV Push every 15 minutes PRN Severe Pain (7 - 10)  melatonin 3 milliGRAM(s) Oral at bedtime PRN Insomnia  morphine  - Injectable 2 milliGRAM(s) IV Push every 4 hours PRN Moderate Pain (4 - 6)  ondansetron Injectable 4 milliGRAM(s) IV Push every 8 hours PRN Nausea and/or Vomiting  ondansetron Injectable 4 milliGRAM(s) IV Push once PRN Nausea and/or Vomiting      Allergies    No Known Allergies    Intolerances        ROS:   General:  No wt loss, fevers, chills, night sweats, fatigue,   Eyes:  Good vision, no reported pain  ENT:  No sore throat, pain, runny nose, dysphagia  CV:  No pain, palpitations, hypo/hypertension  Resp:  No dyspnea, cough, tachypnea, wheezing  GI:  No pain, No nausea, No vomiting, No diarrhea, No constipation, No weight loss, No fever, No pruritis, No rectal bleeding, No tarry stools, No dysphagia,  :  No pain, bleeding, incontinence, nocturia  Muscle:  No pain, weakness  Neuro:  No weakness, tingling, memory problems  Psych:  No fatigue, insomnia, mood problems, depression  Endocrine:  No polyuria, polydipsia, cold/heat intolerance  Heme:  No petechiae, ecchymosis, easy bruisability  Skin:  No rash, tattoos, scars, edema      PHYSICAL EXAM:   Vital Signs:  Vital Signs Last 24 Hrs  T(C): 36.7 (20 Aug 2021 05:43), Max: 36.9 (19 Aug 2021 21:01)  T(F): 98 (20 Aug 2021 05:43), Max: 98.4 (19 Aug 2021 21:01)  HR: 93 (20 Aug 2021 05:43) (45 - 93)  BP: 100/61 (20 Aug 2021 05:43) (100/61 - 127/73)  BP(mean): --  RR: 16 (20 Aug 2021 05:43) (14 - 16)  SpO2: 98% (20 Aug 2021 05:43) (95% - 99%)  Daily     Daily     GENERAL:  Appears stated age,   HEENT:  NC/AT,    CHEST:  Full & symmetric excursion,   HEART:  Regular rhythm,  ABDOMEN:  Soft, non-tender, non-distended,  EXTEREMITIES:  no cyanosis  SKIN:  No rash  NEURO:  Alert,       LABS:                        12.8   5.07  )-----------( 159      ( 20 Aug 2021 08:15 )             38.0     08-20    144  |  112<H>  |  7   ----------------------------<  95  4.1   |  29  |  0.96    Ca    8.2<L>      20 Aug 2021 08:15  Phos  2.5     08-20  Mg     2.1     08-20    TPro  6.1  /  Alb  3.0<L>  /  TBili  3.6<H>  /  DBili  x   /  AST  79<H>  /  ALT  386<H>  /  AlkPhos  127<H>  08-20    PT/INR - ( 18 Aug 2021 15:52 )   PT: 14.2 sec;   INR: 1.23 ratio         PTT - ( 18 Aug 2021 15:52 )  PTT:33.5 sec  Urinalysis Basic - ( 18 Aug 2021 16:27 )    Color: Nahed / Appearance: Slightly Turbid / S.020 / pH: x  Gluc: x / Ketone: Trace  / Bili: Large / Urobili: 8   Blood: x / Protein: 15 / Nitrite: Positive   Leuk Esterase: Trace / RBC: 3-5 /HPF / WBC 3-5   Sq Epi: x / Non Sq Epi: x / Bacteria: Few        RADIOLOGY & ADDITIONAL TESTS:

## 2021-08-20 NOTE — PROGRESS NOTE ADULT - ASSESSMENT
Patient is a 31 year old male with PMH of hypothyroidism who presents to the ED with colicky epigastric pain and jaundice. Found to have cholelithiasis and choledocholithiasis on CT A/P.

## 2021-08-20 NOTE — PROGRESS NOTE ADULT - SUBJECTIVE AND OBJECTIVE BOX
SURGERY  Spectralink [phone]    Pt seen and examined. Pt denies any overnight events. Pt toleraed ERCP yesterday, multiple stones removed. Pt denies any nausea/vomiting. Pt reports ambulating with assistance. Tolerating clear liquid diet.    ICU Vital Signs Last 24 Hrs  T(C): 36.7 (20 Aug 2021 05:43), Max: 36.9 (19 Aug 2021 21:01)  T(F): 98 (20 Aug 2021 05:43), Max: 98.4 (19 Aug 2021 21:01)  HR: 93 (20 Aug 2021 05:43) (45 - 93)  BP: 100/61 (20 Aug 2021 05:43) (100/61 - 127/73)  BP(mean): --  ABP: --  ABP(mean): --  RR: 16 (20 Aug 2021 05:43) (14 - 16)  SpO2: 98% (20 Aug 2021 05:43) (95% - 99%)      I&O's Detail    19 Aug 2021 07:01  -  20 Aug 2021 07:00  --------------------------------------------------------  IN:    IV PiggyBack: 25 mL    IV PiggyBack: 100 mL    Oral Fluid: 850 mL    sodium chloride 0.9%: 3625 mL  Total IN: 4600 mL    OUT:    Voided (mL): 800 mL  Total OUT: 800 mL    Total NET: 3800 mL    Physical exam: Pt sitting comfortably in bed in NAD  Chest- CTA bilaterally   CV- S1 & S2, RRR  Abdomen- Soft, non-tender, non-distended. ( + ) BS. No RUQ or epigastric pain to deep palpation.     LABS:                        12.8   5.07  )-----------( 159      ( 20 Aug 2021 08:15 )             38.0     08-20    144  |  112<H>  |  7   ----------------------------<  95  4.1   |  29  |  0.96    Ca    8.2<L>      20 Aug 2021 08:15  Phos  2.5     08-20  Mg     2.1     08-20    TPro  6.1  /  Alb  3.0<L>  /  TBili  3.6<H>  /  DBili  x   /  AST  79<H>  /  ALT  386<H>  /  AlkPhos  127<H>  08-20    PT/INR - ( 18 Aug 2021 15:52 )   PT: 14.2 sec;   INR: 1.23 ratio         PTT - ( 18 Aug 2021 15:52 )  PTT:33.5 sec  Urinalysis Basic - ( 18 Aug 2021 16:27 )    Color: Nahed / Appearance: Slightly Turbid / S.020 / pH: x  Gluc: x / Ketone: Trace  / Bili: Large / Urobili: 8   Blood: x / Protein: 15 / Nitrite: Positive   Leuk Esterase: Trace / RBC: 3-5 /HPF / WBC 3-5   Sq Epi: x / Non Sq Epi: x / Bacteria: Few    31 year old male with PMH of hypothyroidism cholelithiasis with choledocholithiasis s/p ERCP, multiple stones removed, LFT's downtrending, bilirubin downtrending 3.6 (8.7)    -Advance diet to low fat as tolerated  -Continue to monitor LFTs, Tbili, if remain downtrending, plan for Laparoscopic Cholecystectomy on 21  -Continue DVT Prophylaxis with SCD's  -Continue IV Antibiotics- IV zosyn  -Continue Incentive Spirometry  -Continue analgesia  -Encourage OOB/ambulation with assistance  -Above discussed with Dr. Kaplan

## 2021-08-20 NOTE — PROGRESS NOTE ADULT - ASSESSMENT
common bile duct stone  elevated lfts  abnormal CT     ct documented cbd stone  s/p ercp, where multiple small stones were removed from cbd  advance diet as tolerated  iv fluid  surgery eval regarding cholecystectomy  monitor lfts    Advanced care planning was discussed with patient and family.  Advanced care planning forms were reviewed and discussed.  Risks, benefits and alternatives of gastroenterologic procedures were discussed in detail and all questions were answered.    30 minutes spent.

## 2021-08-20 NOTE — PROGRESS NOTE ADULT - ASSESSMENT
Pt. was seen and examined this AM. He offers no complaints. His diet will be advanced and surgery will be performed on Monday. All this was discussed with medical team and the patient. Pt. was seen and examined this AM. He offers no complaints. His diet will be advanced and surgery will be performed on Monday. All this was discussed with medical team and the patient.  Surg. Att.  Pt continues to do well awaits surgery on Monday.

## 2021-08-20 NOTE — PROGRESS NOTE ADULT - SUBJECTIVE AND OBJECTIVE BOX
Patient is a 31y old  Male who presents with a chief complaint of Choledocholithiasis (20 Aug 2021 12:06)      INTERVAL HPI/OVERNIGHT EVENTS: Pt seen and examined at bedside. Pt states slept well overnight with no acute complaints. Back pain improved. Abd benign. denies fevers, chills. Does admits mild dysuria.    MEDICATIONS  (STANDING):  levothyroxine 112.5 MICROGram(s) Oral daily  piperacillin/tazobactam IVPB.. 3.375 Gram(s) IV Intermittent every 8 hours    MEDICATIONS  (PRN):  benzocaine 15 mG/menthol 3.6 mG (Sugar-Free) Lozenge 1 Lozenge Oral three times a day PRN Sore Throat  HYDROmorphone  Injectable 0.5 milliGRAM(s) IV Push every 15 minutes PRN Severe Pain (7 - 10)  melatonin 3 milliGRAM(s) Oral at bedtime PRN Insomnia  morphine  - Injectable 2 milliGRAM(s) IV Push every 4 hours PRN Moderate Pain (4 - 6)  ondansetron Injectable 4 milliGRAM(s) IV Push every 8 hours PRN Nausea and/or Vomiting  ondansetron Injectable 4 milliGRAM(s) IV Push once PRN Nausea and/or Vomiting      Allergies    No Known Allergies    Intolerances        REVIEW OF SYSTEMS:  CONSTITUTIONAL: No fever or chills  HEENT:  No headache, no sore throat  RESPIRATORY: No cough, wheezing, or shortness of breath  CARDIOVASCULAR: No chest pain, palpitations, or leg swelling  GASTROINTESTINAL: No abd pain, nausea, vomiting, or diarrhea  GENITOURINARY: No dysuria, frequency, or hematuria  NEUROLOGICAL: no focal weakness or dizziness  MUSCULOSKELETAL: no myalgias     Vital Signs Last 24 Hrs  T(C): 36.5 (20 Aug 2021 13:03), Max: 36.9 (19 Aug 2021 21:01)  T(F): 97.7 (20 Aug 2021 13:03), Max: 98.4 (19 Aug 2021 21:01)  HR: 50 (20 Aug 2021 13:03) (45 - 93)  BP: 119/75 (20 Aug 2021 13:03) (100/61 - 119/75)  BP(mean): --  RR: 16 (20 Aug 2021 13:03) (16 - 16)  SpO2: 98% (20 Aug 2021 13:03) (97% - 98%)    PHYSICAL EXAM:  GENERAL: M in NAD  HEENT:  EOMI, moist mucous membranes  CHEST/LUNG:  CTA b/l, no rales, wheezes, or rhonchi  HEART:  RRR, S1, S2  ABDOMEN:  BS+, soft, nontender, nondistended  EXTREMITIES: no edema, cyanosis, or calf tenderness  NERVOUS SYSTEM: AA&Ox3    LABS:                        12.8   5.07  )-----------( 159      ( 20 Aug 2021 08:15 )             38.0     CBC Full  -  ( 20 Aug 2021 08:15 )  WBC Count : 5.07 K/uL  Hemoglobin : 12.8 g/dL  Hematocrit : 38.0 %  Platelet Count - Automated : 159 K/uL  Mean Cell Volume : 88.4 fl  Mean Cell Hemoglobin : 29.8 pg  Mean Cell Hemoglobin Concentration : 33.7 gm/dL  Auto Neutrophil # : 3.25 K/uL  Auto Lymphocyte # : 1.03 K/uL  Auto Monocyte # : 0.42 K/uL  Auto Eosinophil # : 0.32 K/uL  Auto Basophil # : 0.03 K/uL  Auto Neutrophil % : 64.1 %  Auto Lymphocyte % : 20.3 %  Auto Monocyte % : 8.3 %  Auto Eosinophil % : 6.3 %  Auto Basophil % : 0.6 %    20 Aug 2021 08:15    144    |  112    |  7      ----------------------------<  95     4.1     |  29     |  0.96     Ca    8.2        20 Aug 2021 08:15  Phos  2.5       20 Aug 2021 08:15  Mg     2.1       20 Aug 2021 08:15    TPro  6.1    /  Alb  3.0    /  TBili  3.6    /  DBili  x      /  AST  79     /  ALT  386    /  AlkPhos  127    20 Aug 2021 08:15    PT/INR - ( 18 Aug 2021 15:52 )   PT: 14.2 sec;   INR: 1.23 ratio         PTT - ( 18 Aug 2021 15:52 )  PTT:33.5 sec  Urinalysis Basic - ( 18 Aug 2021 16:27 )    Color: Nahed / Appearance: Slightly Turbid / S.020 / pH: x  Gluc: x / Ketone: Trace  / Bili: Large / Urobili: 8   Blood: x / Protein: 15 / Nitrite: Positive   Leuk Esterase: Trace / RBC: 3-5 /HPF / WBC 3-5   Sq Epi: x / Non Sq Epi: x / Bacteria: Few      CAPILLARY BLOOD GLUCOSE              RADIOLOGY & ADDITIONAL TESTS:    Personally reviewed.     Consultant(s) Notes Reviewed:  [x] YES  [ ] NO    Care Discussed with [x] Consultants  [x] Patient  [ ] Family  [ ]      [ ] Other; RN  DVT ppx

## 2021-08-21 LAB
ALBUMIN SERPL ELPH-MCNC: 3.4 G/DL — SIGNIFICANT CHANGE UP (ref 3.3–5)
ALP SERPL-CCNC: 127 U/L — HIGH (ref 40–120)
ALT FLD-CCNC: 341 U/L — HIGH (ref 12–78)
ANION GAP SERPL CALC-SCNC: 4 MMOL/L — LOW (ref 5–17)
AST SERPL-CCNC: 70 U/L — HIGH (ref 15–37)
BASOPHILS # BLD AUTO: 0.03 K/UL — SIGNIFICANT CHANGE UP (ref 0–0.2)
BASOPHILS NFR BLD AUTO: 0.5 % — SIGNIFICANT CHANGE UP (ref 0–2)
BILIRUB SERPL-MCNC: 2.2 MG/DL — HIGH (ref 0.2–1.2)
BUN SERPL-MCNC: 10 MG/DL — SIGNIFICANT CHANGE UP (ref 7–23)
CALCIUM SERPL-MCNC: 8.4 MG/DL — LOW (ref 8.5–10.1)
CHLORIDE SERPL-SCNC: 112 MMOL/L — HIGH (ref 96–108)
CO2 SERPL-SCNC: 27 MMOL/L — SIGNIFICANT CHANGE UP (ref 22–31)
CREAT SERPL-MCNC: 1.1 MG/DL — SIGNIFICANT CHANGE UP (ref 0.5–1.3)
EOSINOPHIL # BLD AUTO: 0.43 K/UL — SIGNIFICANT CHANGE UP (ref 0–0.5)
EOSINOPHIL NFR BLD AUTO: 7.3 % — HIGH (ref 0–6)
GLUCOSE SERPL-MCNC: 90 MG/DL — SIGNIFICANT CHANGE UP (ref 70–99)
HCT VFR BLD CALC: 39.1 % — SIGNIFICANT CHANGE UP (ref 39–50)
HGB BLD-MCNC: 13.4 G/DL — SIGNIFICANT CHANGE UP (ref 13–17)
IMM GRANULOCYTES NFR BLD AUTO: 0.5 % — SIGNIFICANT CHANGE UP (ref 0–1.5)
LYMPHOCYTES # BLD AUTO: 1.18 K/UL — SIGNIFICANT CHANGE UP (ref 1–3.3)
LYMPHOCYTES # BLD AUTO: 20.1 % — SIGNIFICANT CHANGE UP (ref 13–44)
MCHC RBC-ENTMCNC: 30.2 PG — SIGNIFICANT CHANGE UP (ref 27–34)
MCHC RBC-ENTMCNC: 34.3 GM/DL — SIGNIFICANT CHANGE UP (ref 32–36)
MCV RBC AUTO: 88.1 FL — SIGNIFICANT CHANGE UP (ref 80–100)
MONOCYTES # BLD AUTO: 0.68 K/UL — SIGNIFICANT CHANGE UP (ref 0–0.9)
MONOCYTES NFR BLD AUTO: 11.6 % — SIGNIFICANT CHANGE UP (ref 2–14)
NEUTROPHILS # BLD AUTO: 3.53 K/UL — SIGNIFICANT CHANGE UP (ref 1.8–7.4)
NEUTROPHILS NFR BLD AUTO: 60 % — SIGNIFICANT CHANGE UP (ref 43–77)
NRBC # BLD: 0 /100 WBCS — SIGNIFICANT CHANGE UP (ref 0–0)
PLATELET # BLD AUTO: 147 K/UL — LOW (ref 150–400)
POTASSIUM SERPL-MCNC: 3.9 MMOL/L — SIGNIFICANT CHANGE UP (ref 3.5–5.3)
POTASSIUM SERPL-SCNC: 3.9 MMOL/L — SIGNIFICANT CHANGE UP (ref 3.5–5.3)
PROT SERPL-MCNC: 6.7 G/DL — SIGNIFICANT CHANGE UP (ref 6–8.3)
RBC # BLD: 4.44 M/UL — SIGNIFICANT CHANGE UP (ref 4.2–5.8)
RBC # FLD: 12.2 % — SIGNIFICANT CHANGE UP (ref 10.3–14.5)
SODIUM SERPL-SCNC: 143 MMOL/L — SIGNIFICANT CHANGE UP (ref 135–145)
WBC # BLD: 5.88 K/UL — SIGNIFICANT CHANGE UP (ref 3.8–10.5)
WBC # FLD AUTO: 5.88 K/UL — SIGNIFICANT CHANGE UP (ref 3.8–10.5)

## 2021-08-21 PROCEDURE — 99233 SBSQ HOSP IP/OBS HIGH 50: CPT

## 2021-08-21 RX ADMIN — Medication 112.5 MICROGRAM(S): at 05:36

## 2021-08-21 RX ADMIN — PIPERACILLIN AND TAZOBACTAM 25 GRAM(S): 4; .5 INJECTION, POWDER, LYOPHILIZED, FOR SOLUTION INTRAVENOUS at 22:35

## 2021-08-21 RX ADMIN — PIPERACILLIN AND TAZOBACTAM 25 GRAM(S): 4; .5 INJECTION, POWDER, LYOPHILIZED, FOR SOLUTION INTRAVENOUS at 13:42

## 2021-08-21 RX ADMIN — PIPERACILLIN AND TAZOBACTAM 25 GRAM(S): 4; .5 INJECTION, POWDER, LYOPHILIZED, FOR SOLUTION INTRAVENOUS at 05:36

## 2021-08-21 NOTE — PROGRESS NOTE ADULT - PROBLEM SELECTOR PLAN 3
- DVT ppx: SCD's, encourage ambulation - DVT ppx: SCD's, encourage ambulation    #UTI  - UA positive, culture NGTD, already on zosyn, afebrile without wbc elevation, pt's symptoms resolved  - continue to monitor

## 2021-08-21 NOTE — PROGRESS NOTE ADULT - ASSESSMENT
common bile duct stone  elevated lfts  abnormal CT     ct documented cbd stone  s/p ercp, where multiple small stones were removed from cbd  advance diet as tolerated  iv fluid  monitor lfts    Advanced care planning was discussed with patient and family.  Advanced care planning forms were reviewed and discussed.  Risks, benefits and alternatives of gastroenterologic procedures were discussed in detail and all questions were answered.    30 minutes spent.

## 2021-08-21 NOTE — PROGRESS NOTE ADULT - SUBJECTIVE AND OBJECTIVE BOX
[facility]  Isacc Cason PA-C  [address] [place] [address]  [phone]      INTERVAL HPI/OVERNIGHT EVENTS:  Pt s/e s/p ERCP  Pt states he feels well, denies pain, nausea, or further GI complaints    MEDICATIONS  (STANDING):  levothyroxine 112.5 MICROGram(s) Oral daily  piperacillin/tazobactam IVPB.. 3.375 Gram(s) IV Intermittent every 8 hours  sodium chloride 0.9%. 1000 milliLiter(s) (125 mL/Hr) IV Continuous <Continuous>    MEDICATIONS  (PRN):  benzocaine 15 mG/menthol 3.6 mG (Sugar-Free) Lozenge 1 Lozenge Oral three times a day PRN Sore Throat  HYDROmorphone  Injectable 0.5 milliGRAM(s) IV Push every 15 minutes PRN Severe Pain (7 - 10)  melatonin 3 milliGRAM(s) Oral at bedtime PRN Insomnia  morphine  - Injectable 2 milliGRAM(s) IV Push every 4 hours PRN Moderate Pain (4 - 6)  ondansetron Injectable 4 milliGRAM(s) IV Push every 8 hours PRN Nausea and/or Vomiting  ondansetron Injectable 4 milliGRAM(s) IV Push once PRN Nausea and/or Vomiting      Allergies    No Known Allergies    Intolerances        ROS:   General:  No wt loss, fevers, chills, night sweats, fatigue,   Eyes:  Good vision, no reported pain  ENT:  No sore throat, pain, runny nose, dysphagia  CV:  No pain, palpitations, hypo/hypertension  Resp:  No dyspnea, cough, tachypnea, wheezing  GI:  No pain, No nausea, No vomiting, No diarrhea, No constipation, No weight loss, No fever, No pruritis, No rectal bleeding, No tarry stools, No dysphagia,  :  No pain, bleeding, incontinence, nocturia  Muscle:  No pain, weakness  Neuro:  No weakness, tingling, memory problems  Psych:  No fatigue, insomnia, mood problems, depression  Endocrine:  No polyuria, polydipsia, cold/heat intolerance  Heme:  No petechiae, ecchymosis, easy bruisability  Skin:  No rash, tattoos, scars, edema      PHYSICAL EXAM:   Vital Signs:  Vital Signs Last 24 Hrs  T(C): 36.7 (20 Aug 2021 05:43), Max: 36.9 (19 Aug 2021 21:01)  T(F): 98 (20 Aug 2021 05:43), Max: 98.4 (19 Aug 2021 21:01)  HR: 93 (20 Aug 2021 05:43) (45 - 93)  BP: 100/61 (20 Aug 2021 05:43) (100/61 - 127/73)  BP(mean): --  RR: 16 (20 Aug 2021 05:43) (14 - 16)  SpO2: 98% (20 Aug 2021 05:43) (95% - 99%)  Daily     Daily     GENERAL:  Appears stated age,   HEENT:  NC/AT,    CHEST:  Full & symmetric excursion,   HEART:  Regular rhythm,  ABDOMEN:  Soft, non-tender, non-distended,  EXTEREMITIES:  no cyanosis  SKIN:  No rash  NEURO:  Alert,       LABS:                        12.8   5.07  )-----------( 159      ( 20 Aug 2021 08:15 )             38.0     08-20    144  |  112<H>  |  7   ----------------------------<  95  4.1   |  29  |  0.96    Ca    8.2<L>      20 Aug 2021 08:15  Phos  2.5     08-20  Mg     2.1     08-20    TPro  6.1  /  Alb  3.0<L>  /  TBili  3.6<H>  /  DBili  x   /  AST  79<H>  /  ALT  386<H>  /  AlkPhos  127<H>  08-20    PT/INR - ( 18 Aug 2021 15:52 )   PT: 14.2 sec;   INR: 1.23 ratio         PTT - ( 18 Aug 2021 15:52 )  PTT:33.5 sec  Urinalysis Basic - ( 18 Aug 2021 16:27 )    Color: Nahed / Appearance: Slightly Turbid / S.020 / pH: x  Gluc: x / Ketone: Trace  / Bili: Large / Urobili: 8   Blood: x / Protein: 15 / Nitrite: Positive   Leuk Esterase: Trace / RBC: 3-5 /HPF / WBC 3-5   Sq Epi: x / Non Sq Epi: x / Bacteria: Few        RADIOLOGY & ADDITIONAL TESTS:

## 2021-08-21 NOTE — PROGRESS NOTE ADULT - SUBJECTIVE AND OBJECTIVE BOX
Patient is a 31y old  Male who presents with a chief complaint of Choledocholithiasis (20 Aug 2021 15:48)      INTERVAL HPI/OVERNIGHT EVENTS:    MEDICATIONS  (STANDING):  levothyroxine 112.5 MICROGram(s) Oral daily  piperacillin/tazobactam IVPB.. 3.375 Gram(s) IV Intermittent every 8 hours    MEDICATIONS  (PRN):  benzocaine 15 mG/menthol 3.6 mG (Sugar-Free) Lozenge 1 Lozenge Oral three times a day PRN Sore Throat  HYDROmorphone  Injectable 0.5 milliGRAM(s) IV Push every 15 minutes PRN Severe Pain (7 - 10)  melatonin 3 milliGRAM(s) Oral at bedtime PRN Insomnia  morphine  - Injectable 2 milliGRAM(s) IV Push every 4 hours PRN Moderate Pain (4 - 6)  ondansetron Injectable 4 milliGRAM(s) IV Push every 8 hours PRN Nausea and/or Vomiting  ondansetron Injectable 4 milliGRAM(s) IV Push once PRN Nausea and/or Vomiting      Allergies    No Known Allergies    Intolerances        REVIEW OF SYSTEMS:  CONSTITUTIONAL: No fever or chills  HEENT:  No headache, no sore throat  RESPIRATORY: No cough, wheezing, or shortness of breath  CARDIOVASCULAR: No chest pain, palpitations, or leg swelling  GASTROINTESTINAL: No abd pain, nausea, vomiting, or diarrhea  GENITOURINARY: No dysuria, frequency, or hematuria  NEUROLOGICAL: no focal weakness or dizziness  MUSCULOSKELETAL: no myalgias     Vital Signs Last 24 Hrs  T(C): 36.7 (21 Aug 2021 05:26), Max: 36.8 (20 Aug 2021 21:36)  T(F): 98 (21 Aug 2021 05:26), Max: 98.2 (20 Aug 2021 21:36)  HR: 52 (21 Aug 2021 05:26) (50 - 54)  BP: 123/69 (21 Aug 2021 05:26) (119/75 - 123/69)  BP(mean): --  RR: 16 (21 Aug 2021 05:26) (16 - 16)  SpO2: 96% (21 Aug 2021 05:26) (96% - 98%)    PHYSICAL EXAM:  GENERAL: NAD  HEENT:  EOMI, moist mucous membranes  CHEST/LUNG:  CTA b/l, no rales, wheezes, or rhonchi  HEART:  RRR, S1, S2  ABDOMEN:  BS+, soft, nontender, nondistended  EXTREMITIES: no edema, cyanosis, or calf tenderness  NERVOUS SYSTEM: AA&Ox3    LABS:                        12.8   5.07  )-----------( 159      ( 20 Aug 2021 08:15 )             38.0     CBC Full  -  ( 20 Aug 2021 08:15 )  WBC Count : 5.07 K/uL  Hemoglobin : 12.8 g/dL  Hematocrit : 38.0 %  Platelet Count - Automated : 159 K/uL  Mean Cell Volume : 88.4 fl  Mean Cell Hemoglobin : 29.8 pg  Mean Cell Hemoglobin Concentration : 33.7 gm/dL  Auto Neutrophil # : 3.25 K/uL  Auto Lymphocyte # : 1.03 K/uL  Auto Monocyte # : 0.42 K/uL  Auto Eosinophil # : 0.32 K/uL  Auto Basophil # : 0.03 K/uL  Auto Neutrophil % : 64.1 %  Auto Lymphocyte % : 20.3 %  Auto Monocyte % : 8.3 %  Auto Eosinophil % : 6.3 %  Auto Basophil % : 0.6 %    20 Aug 2021 08:15    144    |  112    |  7      ----------------------------<  95     4.1     |  29     |  0.96     Ca    8.2        20 Aug 2021 08:15  Phos  2.5       20 Aug 2021 08:15  Mg     2.1       20 Aug 2021 08:15    TPro  6.1    /  Alb  3.0    /  TBili  3.6    /  DBili  x      /  AST  79     /  ALT  386    /  AlkPhos  127    20 Aug 2021 08:15        CAPILLARY BLOOD GLUCOSE            Culture - Urine (collected 08-20-21 @ 01:16)  Source: Clean Catch Clean Catch (Midstream)  Final Report (08-20-21 @ 22:02):    No growth        RADIOLOGY & ADDITIONAL TESTS:    Personally reviewed.     Consultant(s) Notes Reviewed:  [x] YES  [ ] NO    Care Discussed with [x] Consultants  [x] Patient  [ ] Family  [ ]      [ ] Other; RN  DVT ppx   Patient is a 31y old  Male who presents with a chief complaint of Choledocholithiasis (20 Aug 2021 15:48)      INTERVAL HPI/OVERNIGHT EVENTS: Pt seen and examined at bedside. Pt states slept well overnight with no acute complaints.     MEDICATIONS  (STANDING):  levothyroxine 112.5 MICROGram(s) Oral daily  piperacillin/tazobactam IVPB.. 3.375 Gram(s) IV Intermittent every 8 hours    MEDICATIONS  (PRN):  benzocaine 15 mG/menthol 3.6 mG (Sugar-Free) Lozenge 1 Lozenge Oral three times a day PRN Sore Throat  HYDROmorphone  Injectable 0.5 milliGRAM(s) IV Push every 15 minutes PRN Severe Pain (7 - 10)  melatonin 3 milliGRAM(s) Oral at bedtime PRN Insomnia  morphine  - Injectable 2 milliGRAM(s) IV Push every 4 hours PRN Moderate Pain (4 - 6)  ondansetron Injectable 4 milliGRAM(s) IV Push every 8 hours PRN Nausea and/or Vomiting  ondansetron Injectable 4 milliGRAM(s) IV Push once PRN Nausea and/or Vomiting      Allergies    No Known Allergies    Intolerances        REVIEW OF SYSTEMS:  CONSTITUTIONAL: No fever or chills  HEENT:  No headache, no sore throat  RESPIRATORY: No cough, wheezing, or shortness of breath  CARDIOVASCULAR: No chest pain, palpitations, or leg swelling  GASTROINTESTINAL: No abd pain, nausea, vomiting, or diarrhea  GENITOURINARY: No dysuria, frequency, or hematuria  NEUROLOGICAL: no focal weakness or dizziness  MUSCULOSKELETAL: no myalgias     Vital Signs Last 24 Hrs  T(C): 36.7 (21 Aug 2021 05:26), Max: 36.8 (20 Aug 2021 21:36)  T(F): 98 (21 Aug 2021 05:26), Max: 98.2 (20 Aug 2021 21:36)  HR: 52 (21 Aug 2021 05:26) (50 - 54)  BP: 123/69 (21 Aug 2021 05:26) (119/75 - 123/69)  BP(mean): --  RR: 16 (21 Aug 2021 05:26) (16 - 16)  SpO2: 96% (21 Aug 2021 05:26) (96% - 98%)    PHYSICAL EXAM:  GENERAL: NAD  HEENT:  EOMI, moist mucous membranes  CHEST/LUNG:  CTA b/l, no rales, wheezes, or rhonchi  HEART:  RRR, S1, S2  ABDOMEN:  BS+, soft, nontender, nondistended  EXTREMITIES: no edema, cyanosis, or calf tenderness  NERVOUS SYSTEM: AA&Ox3    LABS:                        12.8   5.07  )-----------( 159      ( 20 Aug 2021 08:15 )             38.0     CBC Full  -  ( 20 Aug 2021 08:15 )  WBC Count : 5.07 K/uL  Hemoglobin : 12.8 g/dL  Hematocrit : 38.0 %  Platelet Count - Automated : 159 K/uL  Mean Cell Volume : 88.4 fl  Mean Cell Hemoglobin : 29.8 pg  Mean Cell Hemoglobin Concentration : 33.7 gm/dL  Auto Neutrophil # : 3.25 K/uL  Auto Lymphocyte # : 1.03 K/uL  Auto Monocyte # : 0.42 K/uL  Auto Eosinophil # : 0.32 K/uL  Auto Basophil # : 0.03 K/uL  Auto Neutrophil % : 64.1 %  Auto Lymphocyte % : 20.3 %  Auto Monocyte % : 8.3 %  Auto Eosinophil % : 6.3 %  Auto Basophil % : 0.6 %    20 Aug 2021 08:15    144    |  112    |  7      ----------------------------<  95     4.1     |  29     |  0.96     Ca    8.2        20 Aug 2021 08:15  Phos  2.5       20 Aug 2021 08:15  Mg     2.1       20 Aug 2021 08:15    TPro  6.1    /  Alb  3.0    /  TBili  3.6    /  DBili  x      /  AST  79     /  ALT  386    /  AlkPhos  127    20 Aug 2021 08:15        CAPILLARY BLOOD GLUCOSE            Culture - Urine (collected 08-20-21 @ 01:16)  Source: Clean Catch Clean Catch (Midstream)  Final Report (08-20-21 @ 22:02):    No growth        RADIOLOGY & ADDITIONAL TESTS:    Personally reviewed.     Consultant(s) Notes Reviewed:  [x] YES  [ ] NO    Care Discussed with [x] Consultants  [x] Patient  [ ] Family  [ ]      [ ] Other; RN  DVT ppx

## 2021-08-21 NOTE — PROGRESS NOTE ADULT - PROBLEM SELECTOR PLAN 1
- Pt presenting with colicky epigastric pain associated with greasy foods, jaundice.   - CT A/P: Cholelithiasis and choledocholithiasis. Mildly dilated common bile duct.  - s/p morphine 4mg IVP x1, zofran 4mg x1, zosyn, NS 1L bolus x1 in ED  - Continue IV zosyn  - trend LFT's  - low fat diet -- tolerated well  - pain control with morphine 2mg IVP q4hrs for moderate pain  - continue zofran PRN for nausea  - s/p ERCP, multiple small stones removed -- may need eventual lap ellen per surgery -- plan for MONDAY tolerating CLD well, Advance as tolerated per surgery TO LOW FAT DIET - Pt presenting with colicky epigastric pain associated with greasy foods, jaundice.   - CT A/P: Cholelithiasis and choledocholithiasis. Mildly dilated common bile duct.  - s/p morphine 4mg IVP x1, zofran 4mg x1, zosyn, NS 1L bolus x1 in ED  - Continue IV zosyn  - trend LFT's  - low fat diet -- tolerated well  - pain control with morphine 2mg IVP q4hrs for moderate pain  - continue zofran PRN for nausea  - s/p ERCP, multiple small stones removed -- may need eventual lap ellen per surgery -- plan for MONDAY tolerating CLD well, Advanced as tolerated per surgery TO LOW FAT DIET

## 2021-08-22 ENCOUNTER — TRANSCRIPTION ENCOUNTER (OUTPATIENT)
Age: 31
End: 2021-08-22

## 2021-08-22 LAB
ALBUMIN SERPL ELPH-MCNC: 3.7 G/DL — SIGNIFICANT CHANGE UP (ref 3.3–5)
ALP SERPL-CCNC: 130 U/L — HIGH (ref 40–120)
ALT FLD-CCNC: 558 U/L — HIGH (ref 12–78)
ANION GAP SERPL CALC-SCNC: 3 MMOL/L — LOW (ref 5–17)
AST SERPL-CCNC: 265 U/L — HIGH (ref 15–37)
BASOPHILS # BLD AUTO: 0.03 K/UL — SIGNIFICANT CHANGE UP (ref 0–0.2)
BASOPHILS NFR BLD AUTO: 0.5 % — SIGNIFICANT CHANGE UP (ref 0–2)
BILIRUB SERPL-MCNC: 2 MG/DL — HIGH (ref 0.2–1.2)
BUN SERPL-MCNC: 12 MG/DL — SIGNIFICANT CHANGE UP (ref 7–23)
CALCIUM SERPL-MCNC: 8.9 MG/DL — SIGNIFICANT CHANGE UP (ref 8.5–10.1)
CHLORIDE SERPL-SCNC: 110 MMOL/L — HIGH (ref 96–108)
CO2 SERPL-SCNC: 28 MMOL/L — SIGNIFICANT CHANGE UP (ref 22–31)
CREAT SERPL-MCNC: 1 MG/DL — SIGNIFICANT CHANGE UP (ref 0.5–1.3)
EOSINOPHIL # BLD AUTO: 0.39 K/UL — SIGNIFICANT CHANGE UP (ref 0–0.5)
EOSINOPHIL NFR BLD AUTO: 7.1 % — HIGH (ref 0–6)
GLUCOSE SERPL-MCNC: 86 MG/DL — SIGNIFICANT CHANGE UP (ref 70–99)
HCT VFR BLD CALC: 41.9 % — SIGNIFICANT CHANGE UP (ref 39–50)
HGB BLD-MCNC: 14.2 G/DL — SIGNIFICANT CHANGE UP (ref 13–17)
IMM GRANULOCYTES NFR BLD AUTO: 0.4 % — SIGNIFICANT CHANGE UP (ref 0–1.5)
LYMPHOCYTES # BLD AUTO: 1.01 K/UL — SIGNIFICANT CHANGE UP (ref 1–3.3)
LYMPHOCYTES # BLD AUTO: 18.3 % — SIGNIFICANT CHANGE UP (ref 13–44)
MCHC RBC-ENTMCNC: 29.6 PG — SIGNIFICANT CHANGE UP (ref 27–34)
MCHC RBC-ENTMCNC: 33.9 GM/DL — SIGNIFICANT CHANGE UP (ref 32–36)
MCV RBC AUTO: 87.5 FL — SIGNIFICANT CHANGE UP (ref 80–100)
MONOCYTES # BLD AUTO: 0.54 K/UL — SIGNIFICANT CHANGE UP (ref 0–0.9)
MONOCYTES NFR BLD AUTO: 9.8 % — SIGNIFICANT CHANGE UP (ref 2–14)
NEUTROPHILS # BLD AUTO: 3.54 K/UL — SIGNIFICANT CHANGE UP (ref 1.8–7.4)
NEUTROPHILS NFR BLD AUTO: 63.9 % — SIGNIFICANT CHANGE UP (ref 43–77)
NRBC # BLD: 0 /100 WBCS — SIGNIFICANT CHANGE UP (ref 0–0)
PLATELET # BLD AUTO: 168 K/UL — SIGNIFICANT CHANGE UP (ref 150–400)
POTASSIUM SERPL-MCNC: 3.9 MMOL/L — SIGNIFICANT CHANGE UP (ref 3.5–5.3)
POTASSIUM SERPL-SCNC: 3.9 MMOL/L — SIGNIFICANT CHANGE UP (ref 3.5–5.3)
PROT SERPL-MCNC: 7.5 G/DL — SIGNIFICANT CHANGE UP (ref 6–8.3)
RBC # BLD: 4.79 M/UL — SIGNIFICANT CHANGE UP (ref 4.2–5.8)
RBC # FLD: 12.1 % — SIGNIFICANT CHANGE UP (ref 10.3–14.5)
SARS-COV-2 RNA SPEC QL NAA+PROBE: SIGNIFICANT CHANGE UP
SODIUM SERPL-SCNC: 141 MMOL/L — SIGNIFICANT CHANGE UP (ref 135–145)
WBC # BLD: 5.53 K/UL — SIGNIFICANT CHANGE UP (ref 3.8–10.5)
WBC # FLD AUTO: 5.53 K/UL — SIGNIFICANT CHANGE UP (ref 3.8–10.5)

## 2021-08-22 PROCEDURE — 99232 SBSQ HOSP IP/OBS MODERATE 35: CPT

## 2021-08-22 PROCEDURE — 99233 SBSQ HOSP IP/OBS HIGH 50: CPT

## 2021-08-22 RX ORDER — SODIUM CHLORIDE 9 MG/ML
1000 INJECTION INTRAMUSCULAR; INTRAVENOUS; SUBCUTANEOUS
Refills: 0 | Status: DISCONTINUED | OUTPATIENT
Start: 2021-08-22 | End: 2021-08-23

## 2021-08-22 RX ADMIN — Medication 112.5 MICROGRAM(S): at 06:12

## 2021-08-22 RX ADMIN — PIPERACILLIN AND TAZOBACTAM 25 GRAM(S): 4; .5 INJECTION, POWDER, LYOPHILIZED, FOR SOLUTION INTRAVENOUS at 14:00

## 2021-08-22 RX ADMIN — PIPERACILLIN AND TAZOBACTAM 25 GRAM(S): 4; .5 INJECTION, POWDER, LYOPHILIZED, FOR SOLUTION INTRAVENOUS at 06:10

## 2021-08-22 RX ADMIN — PIPERACILLIN AND TAZOBACTAM 25 GRAM(S): 4; .5 INJECTION, POWDER, LYOPHILIZED, FOR SOLUTION INTRAVENOUS at 22:08

## 2021-08-22 NOTE — PROGRESS NOTE ADULT - SUBJECTIVE AND OBJECTIVE BOX
[facility]  Isacc Cason PA-C  [address] [place] [address]  [phone]      INTERVAL HPI/OVERNIGHT EVENTS:  Pt s/e s/p ERCP  Pt states he feels well, denies pain, nausea, or further GI complaints    MEDICATIONS  (STANDING):  levothyroxine 112.5 MICROGram(s) Oral daily  piperacillin/tazobactam IVPB.. 3.375 Gram(s) IV Intermittent every 8 hours  sodium chloride 0.9%. 1000 milliLiter(s) (125 mL/Hr) IV Continuous <Continuous>    MEDICATIONS  (PRN):  benzocaine 15 mG/menthol 3.6 mG (Sugar-Free) Lozenge 1 Lozenge Oral three times a day PRN Sore Throat  HYDROmorphone  Injectable 0.5 milliGRAM(s) IV Push every 15 minutes PRN Severe Pain (7 - 10)  melatonin 3 milliGRAM(s) Oral at bedtime PRN Insomnia  morphine  - Injectable 2 milliGRAM(s) IV Push every 4 hours PRN Moderate Pain (4 - 6)  ondansetron Injectable 4 milliGRAM(s) IV Push every 8 hours PRN Nausea and/or Vomiting  ondansetron Injectable 4 milliGRAM(s) IV Push once PRN Nausea and/or Vomiting      Allergies    No Known Allergies    Intolerances        ROS:   General:  No wt loss, fevers, chills, night sweats, fatigue,   Eyes:  Good vision, no reported pain  ENT:  No sore throat, pain, runny nose, dysphagia  CV:  No pain, palpitations, hypo/hypertension  Resp:  No dyspnea, cough, tachypnea, wheezing  GI:  No pain, No nausea, No vomiting, No diarrhea, No constipation, No weight loss, No fever, No pruritis, No rectal bleeding, No tarry stools, No dysphagia,  :  No pain, bleeding, incontinence, nocturia  Muscle:  No pain, weakness  Neuro:  No weakness, tingling, memory problems  Psych:  No fatigue, insomnia, mood problems, depression  Endocrine:  No polyuria, polydipsia, cold/heat intolerance  Heme:  No petechiae, ecchymosis, easy bruisability  Skin:  No rash, tattoos, scars, edema      PHYSICAL EXAM:   Vital Signs:  Vital Signs Last 24 Hrs  T(C): 36.7 (20 Aug 2021 05:43), Max: 36.9 (19 Aug 2021 21:01)  T(F): 98 (20 Aug 2021 05:43), Max: 98.4 (19 Aug 2021 21:01)  HR: 93 (20 Aug 2021 05:43) (45 - 93)  BP: 100/61 (20 Aug 2021 05:43) (100/61 - 127/73)  BP(mean): --  RR: 16 (20 Aug 2021 05:43) (14 - 16)  SpO2: 98% (20 Aug 2021 05:43) (95% - 99%)  Daily     Daily     GENERAL:  Appears stated age,   HEENT:  NC/AT,    CHEST:  Full & symmetric excursion,   HEART:  Regular rhythm,  ABDOMEN:  Soft, non-tender, non-distended,  EXTEREMITIES:  no cyanosis  SKIN:  No rash  NEURO:  Alert,       LABS:                        12.8   5.07  )-----------( 159      ( 20 Aug 2021 08:15 )             38.0     08-20    144  |  112<H>  |  7   ----------------------------<  95  4.1   |  29  |  0.96    Ca    8.2<L>      20 Aug 2021 08:15  Phos  2.5     08-20  Mg     2.1     08-20    TPro  6.1  /  Alb  3.0<L>  /  TBili  3.6<H>  /  DBili  x   /  AST  79<H>  /  ALT  386<H>  /  AlkPhos  127<H>  08-20    PT/INR - ( 18 Aug 2021 15:52 )   PT: 14.2 sec;   INR: 1.23 ratio         PTT - ( 18 Aug 2021 15:52 )  PTT:33.5 sec  Urinalysis Basic - ( 18 Aug 2021 16:27 )    Color: Nahed / Appearance: Slightly Turbid / S.020 / pH: x  Gluc: x / Ketone: Trace  / Bili: Large / Urobili: 8   Blood: x / Protein: 15 / Nitrite: Positive   Leuk Esterase: Trace / RBC: 3-5 /HPF / WBC 3-5   Sq Epi: x / Non Sq Epi: x / Bacteria: Few        RADIOLOGY & ADDITIONAL TESTS:

## 2021-08-22 NOTE — PROGRESS NOTE ADULT - PROBLEM SELECTOR PLAN 3
- DVT ppx: SCD's, encourage ambulation    #UTI  - UA positive, culture NGTD, already on zosyn, afebrile without wbc elevation, pt's symptoms resolved  - continue to monitor

## 2021-08-22 NOTE — PROGRESS NOTE ADULT - PROBLEM SELECTOR PLAN 1
- Pt presenting with colicky epigastric pain associated with greasy foods, jaundice.   - CT A/P: Cholelithiasis and choledocholithiasis. Mildly dilated common bile duct.  - s/p morphine 4mg IVP x1, zofran 4mg x1, zosyn, NS 1L bolus x1 in ED  - Continue IV zosyn  - trend LFT's  - low fat diet -- tolerated well  - pain control with morphine 2mg IVP q4hrs for moderate pain  - continue zofran PRN for nausea  - s/p ERCP, multiple small stones removed -- may need eventual lap ellen per surgery -- plan for MONDAY tolerating CLD well, Advanced as tolerated per surgery TO LOW FAT DIET - Pt presenting with colicky epigastric pain associated with greasy foods, jaundice.   - CT A/P: Cholelithiasis and choledocholithiasis. Mildly dilated common bile duct.  - s/p morphine 4mg IVP x1, zofran 4mg x1, zosyn, NS 1L bolus x1 in ED  - Continue IV zosyn  - trend LFT's -- mildly increased today  - low fat diet -- tolerated well  - pain control with morphine 2mg IVP q4hrs for moderate pain  - continue zofran PRN for nausea  - s/p ERCP, multiple small stones removed -- may need eventual lap ellen per surgery -- plan for MONDAY tolerating CLD well, Advanced as tolerated per surgery TO LOW FAT DIET -- NPO after MN

## 2021-08-22 NOTE — PROGRESS NOTE ADULT - ASSESSMENT
30 yo male with Cholelithiasis/Choledocholithiasis S/P ERCP on 8/19, tolerating diet, scheduled for OR on 8/23 with Dr. Kaplan.  32 yo male with Cholelithiasis/Choledocholithiasis S/P ERCP on 8/19, tolerating diet, scheduled for OR on 8/23 with Dr. Kaplan.   Surg. Att.   Pt. remains stable. OR in AM.

## 2021-08-22 NOTE — PROGRESS NOTE ADULT - SUBJECTIVE AND OBJECTIVE BOX
Patient is a 31y old  Male who presents with a chief complaint of Choledocholithiasis (21 Aug 2021 22:39)      INTERVAL HPI/OVERNIGHT EVENTS:    MEDICATIONS  (STANDING):  levothyroxine 112.5 MICROGram(s) Oral daily  piperacillin/tazobactam IVPB.. 3.375 Gram(s) IV Intermittent every 8 hours    MEDICATIONS  (PRN):  benzocaine 15 mG/menthol 3.6 mG (Sugar-Free) Lozenge 1 Lozenge Oral three times a day PRN Sore Throat  HYDROmorphone  Injectable 0.5 milliGRAM(s) IV Push every 15 minutes PRN Severe Pain (7 - 10)  melatonin 3 milliGRAM(s) Oral at bedtime PRN Insomnia  morphine  - Injectable 2 milliGRAM(s) IV Push every 4 hours PRN Moderate Pain (4 - 6)  ondansetron Injectable 4 milliGRAM(s) IV Push every 8 hours PRN Nausea and/or Vomiting  ondansetron Injectable 4 milliGRAM(s) IV Push once PRN Nausea and/or Vomiting      Allergies    No Known Allergies    Intolerances        REVIEW OF SYSTEMS:  CONSTITUTIONAL: No fever or chills  HEENT:  No headache, no sore throat  RESPIRATORY: No cough, wheezing, or shortness of breath  CARDIOVASCULAR: No chest pain, palpitations, or leg swelling  GASTROINTESTINAL: No abd pain, nausea, vomiting, or diarrhea  GENITOURINARY: No dysuria, frequency, or hematuria  NEUROLOGICAL: no focal weakness or dizziness  MUSCULOSKELETAL: no myalgias     Vital Signs Last 24 Hrs  T(C): 36.4 (22 Aug 2021 05:38), Max: 37 (21 Aug 2021 22:34)  T(F): 97.6 (22 Aug 2021 05:38), Max: 98.6 (21 Aug 2021 22:34)  HR: 56 (22 Aug 2021 05:38) (50 - 56)  BP: 108/68 (22 Aug 2021 05:38) (108/68 - 122/74)  BP(mean): --  RR: 18 (22 Aug 2021 05:38) (17 - 18)  SpO2: 97% (22 Aug 2021 05:38) (96% - 97%)    PHYSICAL EXAM:  GENERAL: NAD  HEENT:  EOMI, moist mucous membranes  CHEST/LUNG:  CTA b/l, no rales, wheezes, or rhonchi  HEART:  RRR, S1, S2  ABDOMEN:  BS+, soft, nontender, nondistended  EXTREMITIES: no edema, cyanosis, or calf tenderness  NERVOUS SYSTEM: AA&Ox3    LABS:    CBC Full  -  ( 21 Aug 2021 08:27 )  WBC Count : 5.88 K/uL  Hemoglobin : 13.4 g/dL  Hematocrit : 39.1 %  Platelet Count - Automated : 147 K/uL  Mean Cell Volume : 88.1 fl  Mean Cell Hemoglobin : 30.2 pg  Mean Cell Hemoglobin Concentration : 34.3 gm/dL  Auto Neutrophil # : 3.53 K/uL  Auto Lymphocyte # : 1.18 K/uL  Auto Monocyte # : 0.68 K/uL  Auto Eosinophil # : 0.43 K/uL  Auto Basophil # : 0.03 K/uL  Auto Neutrophil % : 60.0 %  Auto Lymphocyte % : 20.1 %  Auto Monocyte % : 11.6 %  Auto Eosinophil % : 7.3 %  Auto Basophil % : 0.5 %      Ca    8.4        21 Aug 2021 08:27          CAPILLARY BLOOD GLUCOSE            Culture - Urine (collected 08-20-21 @ 01:16)  Source: Clean Catch Clean Catch (Midstream)  Final Report (08-20-21 @ 22:02):    No growth        RADIOLOGY & ADDITIONAL TESTS:    Personally reviewed.     Consultant(s) Notes Reviewed:  [x] YES  [ ] NO    Care Discussed with [x] Consultants  [x] Patient  [ ] Family  [ ]      [ ] Other; RN  DVT ppx   Patient is a 31y old  Male who presents with a chief complaint of Choledocholithiasis (21 Aug 2021 22:39)      INTERVAL HPI/OVERNIGHT EVENTS: Pt seen and examined at bedside. Pt states slept well overnight with no acute complaints. denies fevers, chills.    MEDICATIONS  (STANDING):  levothyroxine 112.5 MICROGram(s) Oral daily  piperacillin/tazobactam IVPB.. 3.375 Gram(s) IV Intermittent every 8 hours    MEDICATIONS  (PRN):  benzocaine 15 mG/menthol 3.6 mG (Sugar-Free) Lozenge 1 Lozenge Oral three times a day PRN Sore Throat  HYDROmorphone  Injectable 0.5 milliGRAM(s) IV Push every 15 minutes PRN Severe Pain (7 - 10)  melatonin 3 milliGRAM(s) Oral at bedtime PRN Insomnia  morphine  - Injectable 2 milliGRAM(s) IV Push every 4 hours PRN Moderate Pain (4 - 6)  ondansetron Injectable 4 milliGRAM(s) IV Push every 8 hours PRN Nausea and/or Vomiting  ondansetron Injectable 4 milliGRAM(s) IV Push once PRN Nausea and/or Vomiting      Allergies    No Known Allergies    Intolerances        REVIEW OF SYSTEMS:  CONSTITUTIONAL: No fever or chills  HEENT:  No headache, no sore throat  RESPIRATORY: No cough, wheezing, or shortness of breath  CARDIOVASCULAR: No chest pain, palpitations, or leg swelling  GASTROINTESTINAL: No abd pain, nausea, vomiting, or diarrhea  GENITOURINARY: No dysuria, frequency, or hematuria  NEUROLOGICAL: no focal weakness or dizziness  MUSCULOSKELETAL: no myalgias     Vital Signs Last 24 Hrs  T(C): 36.4 (22 Aug 2021 05:38), Max: 37 (21 Aug 2021 22:34)  T(F): 97.6 (22 Aug 2021 05:38), Max: 98.6 (21 Aug 2021 22:34)  HR: 56 (22 Aug 2021 05:38) (50 - 56)  BP: 108/68 (22 Aug 2021 05:38) (108/68 - 122/74)  BP(mean): --  RR: 18 (22 Aug 2021 05:38) (17 - 18)  SpO2: 97% (22 Aug 2021 05:38) (96% - 97%)    PHYSICAL EXAM:  GENERAL: M in NAD  HEENT:  EOMI, moist mucous membranes  CHEST/LUNG:  CTA b/l, no rales, wheezes, or rhonchi  HEART:  RRR, S1, S2  ABDOMEN:  BS+, soft, nontender, nondistended  EXTREMITIES: no edema, cyanosis, or calf tenderness  NERVOUS SYSTEM: AA&Ox3    LABS:    CBC Full  -  ( 21 Aug 2021 08:27 )  WBC Count : 5.88 K/uL  Hemoglobin : 13.4 g/dL  Hematocrit : 39.1 %  Platelet Count - Automated : 147 K/uL  Mean Cell Volume : 88.1 fl  Mean Cell Hemoglobin : 30.2 pg  Mean Cell Hemoglobin Concentration : 34.3 gm/dL  Auto Neutrophil # : 3.53 K/uL  Auto Lymphocyte # : 1.18 K/uL  Auto Monocyte # : 0.68 K/uL  Auto Eosinophil # : 0.43 K/uL  Auto Basophil # : 0.03 K/uL  Auto Neutrophil % : 60.0 %  Auto Lymphocyte % : 20.1 %  Auto Monocyte % : 11.6 %  Auto Eosinophil % : 7.3 %  Auto Basophil % : 0.5 %      Ca    8.4        21 Aug 2021 08:27          CAPILLARY BLOOD GLUCOSE            Culture - Urine (collected 08-20-21 @ 01:16)  Source: Clean Catch Clean Catch (Midstream)  Final Report (08-20-21 @ 22:02):    No growth        RADIOLOGY & ADDITIONAL TESTS:    Personally reviewed.     Consultant(s) Notes Reviewed:  [x] YES  [ ] NO    Care Discussed with [x] Consultants  [x] Patient  [ ] Family  [ ]      [ ] Other; RN  DVT ppx

## 2021-08-22 NOTE — PROGRESS NOTE ADULT - PROBLEM SELECTOR PLAN 1
Pre op patient for Surgery 8/23  Will be NPO after midnight.   Continue diet for now.   OOB, ambulation  Continue Antibiotics.   Will discuss with Dr. Kaplan.

## 2021-08-22 NOTE — PROGRESS NOTE ADULT - SUBJECTIVE AND OBJECTIVE BOX
hospital day: 4    31y Male admitted with Calculus of bile duct without obstruction, cholangitis, or cholecystitis      Patient seen and examined bedside resting comfortably.  No complaints at this time.  Currently on a low fat diet.  Tolerating.  going to OR tomorrow.  Will pre op.  Pt doing well.     T(F): 97.6 (08-22-21 @ 05:38), Max: 98.6 (08-21-21 @ 22:34)  HR: 56 (08-22-21 @ 05:38) (50 - 56)  BP: 108/68 (08-22-21 @ 05:38) (108/68 - 122/74)  RR: 18 (08-22-21 @ 05:38) (17 - 18)  SpO2: 97% (08-22-21 @ 05:38) (96% - 97%)  Wt(kg): --  CAPILLARY BLOOD GLUCOSE          PHYSICAL EXAM:  General: NAD  Neuro:  Alert & oriented x 3  CV: +S1+S2 regular rate and rhythm  Lung: clear to ausculation bilaterally  Abdomen: BS+ Soft, NT, ND.   Extremities: no pedal edema or calf tenderness noted       LABS:                        13.4   5.88  )-----------( 147      ( 21 Aug 2021 08:27 )             39.1     08-21    143  |  112<H>  |  10  ----------------------------<  90  3.9   |  27  |  1.10    Ca    8.4<L>      21 Aug 2021 08:27    TPro  6.7  /  Alb  3.4  /  TBili  2.2<H>  /  DBili  x   /  AST  70<H>  /  ALT  341<H>  /  AlkPhos  127<H>  08-21      I&O's Detail    21 Aug 2021 07:01  -  22 Aug 2021 07:00  --------------------------------------------------------  IN:    IV PiggyBack: 150 mL  Total IN: 150 mL    OUT:  Total OUT: 0 mL    Total NET: 150 mL            RADIOLOGY:

## 2021-08-23 ENCOUNTER — RESULT REVIEW (OUTPATIENT)
Age: 31
End: 2021-08-23

## 2021-08-23 LAB
ALBUMIN SERPL ELPH-MCNC: 3.8 G/DL — SIGNIFICANT CHANGE UP (ref 3.3–5)
ALP SERPL-CCNC: 126 U/L — HIGH (ref 40–120)
ALT FLD-CCNC: 506 U/L — HIGH (ref 12–78)
ANION GAP SERPL CALC-SCNC: 5 MMOL/L — SIGNIFICANT CHANGE UP (ref 5–17)
APTT BLD: 33 SEC — SIGNIFICANT CHANGE UP (ref 27.5–35.5)
AST SERPL-CCNC: 160 U/L — HIGH (ref 15–37)
BASOPHILS # BLD AUTO: 0.05 K/UL — SIGNIFICANT CHANGE UP (ref 0–0.2)
BASOPHILS NFR BLD AUTO: 0.8 % — SIGNIFICANT CHANGE UP (ref 0–2)
BILIRUB SERPL-MCNC: 2.2 MG/DL — HIGH (ref 0.2–1.2)
BLD GP AB SCN SERPL QL: SIGNIFICANT CHANGE UP
BUN SERPL-MCNC: 13 MG/DL — SIGNIFICANT CHANGE UP (ref 7–23)
CALCIUM SERPL-MCNC: 9.1 MG/DL — SIGNIFICANT CHANGE UP (ref 8.5–10.1)
CHLORIDE SERPL-SCNC: 109 MMOL/L — HIGH (ref 96–108)
CO2 SERPL-SCNC: 28 MMOL/L — SIGNIFICANT CHANGE UP (ref 22–31)
CREAT SERPL-MCNC: 0.92 MG/DL — SIGNIFICANT CHANGE UP (ref 0.5–1.3)
EOSINOPHIL # BLD AUTO: 0.44 K/UL — SIGNIFICANT CHANGE UP (ref 0–0.5)
EOSINOPHIL NFR BLD AUTO: 7.4 % — HIGH (ref 0–6)
GLUCOSE SERPL-MCNC: 85 MG/DL — SIGNIFICANT CHANGE UP (ref 70–99)
HCT VFR BLD CALC: 43.7 % — SIGNIFICANT CHANGE UP (ref 39–50)
HGB BLD-MCNC: 14.7 G/DL — SIGNIFICANT CHANGE UP (ref 13–17)
IMM GRANULOCYTES NFR BLD AUTO: 0.5 % — SIGNIFICANT CHANGE UP (ref 0–1.5)
INR BLD: 1.14 RATIO — SIGNIFICANT CHANGE UP (ref 0.88–1.16)
LYMPHOCYTES # BLD AUTO: 1.21 K/UL — SIGNIFICANT CHANGE UP (ref 1–3.3)
LYMPHOCYTES # BLD AUTO: 20.4 % — SIGNIFICANT CHANGE UP (ref 13–44)
MCHC RBC-ENTMCNC: 29.5 PG — SIGNIFICANT CHANGE UP (ref 27–34)
MCHC RBC-ENTMCNC: 33.6 GM/DL — SIGNIFICANT CHANGE UP (ref 32–36)
MCV RBC AUTO: 87.8 FL — SIGNIFICANT CHANGE UP (ref 80–100)
MONOCYTES # BLD AUTO: 0.58 K/UL — SIGNIFICANT CHANGE UP (ref 0–0.9)
MONOCYTES NFR BLD AUTO: 9.8 % — SIGNIFICANT CHANGE UP (ref 2–14)
NEUTROPHILS # BLD AUTO: 3.63 K/UL — SIGNIFICANT CHANGE UP (ref 1.8–7.4)
NEUTROPHILS NFR BLD AUTO: 61.1 % — SIGNIFICANT CHANGE UP (ref 43–77)
NRBC # BLD: 0 /100 WBCS — SIGNIFICANT CHANGE UP (ref 0–0)
PLATELET # BLD AUTO: 181 K/UL — SIGNIFICANT CHANGE UP (ref 150–400)
POTASSIUM SERPL-MCNC: 4 MMOL/L — SIGNIFICANT CHANGE UP (ref 3.5–5.3)
POTASSIUM SERPL-SCNC: 4 MMOL/L — SIGNIFICANT CHANGE UP (ref 3.5–5.3)
PROT SERPL-MCNC: 7.7 G/DL — SIGNIFICANT CHANGE UP (ref 6–8.3)
PROTHROM AB SERPL-ACNC: 13.3 SEC — SIGNIFICANT CHANGE UP (ref 10.6–13.6)
RBC # BLD: 4.98 M/UL — SIGNIFICANT CHANGE UP (ref 4.2–5.8)
RBC # FLD: 12.2 % — SIGNIFICANT CHANGE UP (ref 10.3–14.5)
SODIUM SERPL-SCNC: 142 MMOL/L — SIGNIFICANT CHANGE UP (ref 135–145)
WBC # BLD: 5.94 K/UL — SIGNIFICANT CHANGE UP (ref 3.8–10.5)
WBC # FLD AUTO: 5.94 K/UL — SIGNIFICANT CHANGE UP (ref 3.8–10.5)

## 2021-08-23 PROCEDURE — 47562 LAPAROSCOPIC CHOLECYSTECTOMY: CPT

## 2021-08-23 PROCEDURE — 47562 LAPAROSCOPIC CHOLECYSTECTOMY: CPT | Mod: 80

## 2021-08-23 PROCEDURE — 99232 SBSQ HOSP IP/OBS MODERATE 35: CPT

## 2021-08-23 PROCEDURE — 88304 TISSUE EXAM BY PATHOLOGIST: CPT | Mod: 26

## 2021-08-23 RX ORDER — IBUPROFEN 200 MG
400 TABLET ORAL EVERY 6 HOURS
Refills: 0 | Status: DISCONTINUED | OUTPATIENT
Start: 2021-08-23 | End: 2021-08-24

## 2021-08-23 RX ORDER — ENOXAPARIN SODIUM 100 MG/ML
40 INJECTION SUBCUTANEOUS DAILY
Refills: 0 | Status: DISCONTINUED | OUTPATIENT
Start: 2021-08-24 | End: 2021-08-24

## 2021-08-23 RX ORDER — HYDROMORPHONE HYDROCHLORIDE 2 MG/ML
0.5 INJECTION INTRAMUSCULAR; INTRAVENOUS; SUBCUTANEOUS
Refills: 0 | Status: DISCONTINUED | OUTPATIENT
Start: 2021-08-23 | End: 2021-08-23

## 2021-08-23 RX ORDER — PANTOPRAZOLE SODIUM 20 MG/1
40 TABLET, DELAYED RELEASE ORAL DAILY
Refills: 0 | Status: DISCONTINUED | OUTPATIENT
Start: 2021-08-24 | End: 2021-08-24

## 2021-08-23 RX ORDER — OXYCODONE HYDROCHLORIDE 5 MG/1
5 TABLET ORAL EVERY 4 HOURS
Refills: 0 | Status: DISCONTINUED | OUTPATIENT
Start: 2021-08-23 | End: 2021-08-24

## 2021-08-23 RX ORDER — ACETAMINOPHEN 500 MG
650 TABLET ORAL EVERY 6 HOURS
Refills: 0 | Status: DISCONTINUED | OUTPATIENT
Start: 2021-08-23 | End: 2021-08-24

## 2021-08-23 RX ORDER — SODIUM CHLORIDE 9 MG/ML
1000 INJECTION, SOLUTION INTRAVENOUS
Refills: 0 | Status: DISCONTINUED | OUTPATIENT
Start: 2021-08-23 | End: 2021-08-24

## 2021-08-23 RX ORDER — SENNA PLUS 8.6 MG/1
2 TABLET ORAL AT BEDTIME
Refills: 0 | Status: DISCONTINUED | OUTPATIENT
Start: 2021-08-23 | End: 2021-08-24

## 2021-08-23 RX ORDER — SODIUM CHLORIDE 9 MG/ML
1000 INJECTION, SOLUTION INTRAVENOUS
Refills: 0 | Status: DISCONTINUED | OUTPATIENT
Start: 2021-08-23 | End: 2021-08-23

## 2021-08-23 RX ORDER — HYDROMORPHONE HYDROCHLORIDE 2 MG/ML
1 INJECTION INTRAMUSCULAR; INTRAVENOUS; SUBCUTANEOUS
Refills: 0 | Status: DISCONTINUED | OUTPATIENT
Start: 2021-08-23 | End: 2021-08-23

## 2021-08-23 RX ORDER — LEVOTHYROXINE SODIUM 125 MCG
112 TABLET ORAL DAILY
Refills: 0 | Status: DISCONTINUED | OUTPATIENT
Start: 2021-08-24 | End: 2021-08-24

## 2021-08-23 RX ORDER — ONDANSETRON 8 MG/1
4 TABLET, FILM COATED ORAL ONCE
Refills: 0 | Status: DISCONTINUED | OUTPATIENT
Start: 2021-08-23 | End: 2021-08-23

## 2021-08-23 RX ADMIN — Medication 400 MILLIGRAM(S): at 13:09

## 2021-08-23 RX ADMIN — Medication 112.5 MICROGRAM(S): at 05:10

## 2021-08-23 RX ADMIN — SODIUM CHLORIDE 50 MILLILITER(S): 9 INJECTION, SOLUTION INTRAVENOUS at 20:59

## 2021-08-23 RX ADMIN — SODIUM CHLORIDE 50 MILLILITER(S): 9 INJECTION, SOLUTION INTRAVENOUS at 13:00

## 2021-08-23 RX ADMIN — Medication 400 MILLIGRAM(S): at 20:59

## 2021-08-23 RX ADMIN — Medication 400 MILLIGRAM(S): at 21:05

## 2021-08-23 RX ADMIN — PIPERACILLIN AND TAZOBACTAM 25 GRAM(S): 4; .5 INJECTION, POWDER, LYOPHILIZED, FOR SOLUTION INTRAVENOUS at 05:10

## 2021-08-23 RX ADMIN — Medication 400 MILLIGRAM(S): at 14:00

## 2021-08-23 NOTE — PROGRESS NOTE ADULT - SUBJECTIVE AND OBJECTIVE BOX
Patient is a 31y old  Male who presents with a chief complaint of Choledocholithiasis (23 Aug 2021 12:58)      INTERVAL HPI/OVERNIGHT EVENTS: Pt seen and examined at bedside. Pt states slept well overnight with no acute complaints. s/p OR, pain controlled.     MEDICATIONS  (STANDING):  lactated ringers. 1000 milliLiter(s) (50 mL/Hr) IV Continuous <Continuous>  senna 2 Tablet(s) Oral at bedtime    MEDICATIONS  (PRN):  acetaminophen   Tablet .. 650 milliGRAM(s) Oral every 6 hours PRN Temp greater or equal to 38C (100.4F), Mild Pain (1 - 3)  ibuprofen  Tablet. 400 milliGRAM(s) Oral every 6 hours PRN Temp greater or equal to 38.5C (101.3F), Moderate Pain (4 - 6)  oxyCODONE    IR 5 milliGRAM(s) Oral every 4 hours PRN Severe Pain (7 - 10)      Allergies    No Known Allergies    Intolerances        REVIEW OF SYSTEMS:  CONSTITUTIONAL: No fever or chills  HEENT:  No headache, no sore throat  RESPIRATORY: No cough, wheezing, or shortness of breath  CARDIOVASCULAR: No chest pain, palpitations, or leg swelling  GASTROINTESTINAL: No abd pain, nausea, vomiting, or diarrhea  GENITOURINARY: No dysuria, frequency, or hematuria  NEUROLOGICAL: no focal weakness or dizziness  MUSCULOSKELETAL: no myalgias     Vital Signs Last 24 Hrs  T(C): 36.9 (23 Aug 2021 13:27), Max: 36.9 (23 Aug 2021 11:15)  T(F): 98.4 (23 Aug 2021 13:27), Max: 98.4 (23 Aug 2021 11:15)  HR: 51 (23 Aug 2021 13:27) (46 - 88)  BP: 127/81 (23 Aug 2021 13:27) (113/73 - 151/85)  BP(mean): --  RR: 15 (23 Aug 2021 13:27) (14 - 18)  SpO2: 94% (23 Aug 2021 13:27) (94% - 98%)    PHYSICAL EXAM:  GENERAL: NAD  HEENT:  EOMI, moist mucous membranes  CHEST/LUNG:  CTA b/l, no rales, wheezes, or rhonchi  HEART:  RRR, S1, S2  ABDOMEN:  BS+, soft, nontender, nondistended. lap ellen site c/d/i  EXTREMITIES: no edema, cyanosis, or calf tenderness  NERVOUS SYSTEM: AA&Ox3    LABS:                        14.7   5.94  )-----------( 181      ( 23 Aug 2021 07:29 )             43.7     CBC Full  -  ( 23 Aug 2021 07:29 )  WBC Count : 5.94 K/uL  Hemoglobin : 14.7 g/dL  Hematocrit : 43.7 %  Platelet Count - Automated : 181 K/uL  Mean Cell Volume : 87.8 fl  Mean Cell Hemoglobin : 29.5 pg  Mean Cell Hemoglobin Concentration : 33.6 gm/dL  Auto Neutrophil # : 3.63 K/uL  Auto Lymphocyte # : 1.21 K/uL  Auto Monocyte # : 0.58 K/uL  Auto Eosinophil # : 0.44 K/uL  Auto Basophil # : 0.05 K/uL  Auto Neutrophil % : 61.1 %  Auto Lymphocyte % : 20.4 %  Auto Monocyte % : 9.8 %  Auto Eosinophil % : 7.4 %  Auto Basophil % : 0.8 %    23 Aug 2021 07:29    142    |  109    |  13     ----------------------------<  85     4.0     |  28     |  0.92     Ca    9.1        23 Aug 2021 07:29    TPro  7.7    /  Alb  3.8    /  TBili  2.2    /  DBili  x      /  AST  160    /  ALT  506    /  AlkPhos  126    23 Aug 2021 07:29    PT/INR - ( 23 Aug 2021 07:29 )   PT: 13.3 sec;   INR: 1.14 ratio         PTT - ( 23 Aug 2021 07:29 )  PTT:33.0 sec    CAPILLARY BLOOD GLUCOSE            Culture - Urine (collected 08-20-21 @ 01:16)  Source: Clean Catch Clean Catch (Midstream)  Final Report (08-20-21 @ 22:02):    No growth        RADIOLOGY & ADDITIONAL TESTS:    Personally reviewed.     Consultant(s) Notes Reviewed:  [x] YES  [ ] NO    Care Discussed with [x] Consultants  [x] Patient  [ ] Family  [ ]      [ ] Other; RN  DVT ppx

## 2021-08-23 NOTE — PROGRESS NOTE ADULT - SUBJECTIVE AND OBJECTIVE BOX
POST OPERATIVE DAY #0  STATUS POST: laparoscopic cholecystectomy    SUBJECTIVE:  Patient seen and examined at bedside.  Patient with no new complaints at this time, states that his pain is well-controlled with medication and tolerating diet. Patient denies any fevers, chills, chest pain, shortness of breath, abdominal pain, nausea, vomiting or diarrhea.    Vital Signs Last 24 Hrs  T(C): 36.9 (23 Aug 2021 20:16), Max: 36.9 (23 Aug 2021 11:15)  T(F): 98.4 (23 Aug 2021 20:16), Max: 98.4 (23 Aug 2021 11:15)  HR: 64 (23 Aug 2021 20:16) (46 - 88)  BP: 144/75 (23 Aug 2021 20:16) (115/70 - 151/85)  BP(mean): --  RR: 16 (23 Aug 2021 20:16) (14 - 18)  SpO2: 96% (23 Aug 2021 20:16) (94% - 98%)    PHYSICAL EXAM:  GENERAL: No acute distress, well-developed  HEAD:  Atraumatic, Normocephalic  ABDOMEN: Soft, non-distended, appropriate remi-incisional ttp; incision sites well-approximated w/ Dermabond  NEUROLOGY: A&O x 3, no focal deficits    I&O's Summary    22 Aug 2021 07:01  -  23 Aug 2021 07:00  --------------------------------------------------------  IN: 450 mL / OUT: 0 mL / NET: 450 mL    23 Aug 2021 07:01  -  23 Aug 2021 22:30  --------------------------------------------------------  IN: 700 mL / OUT: 300 mL / NET: 400 mL      MEDICATIONS  (STANDING):  lactated ringers. 1000 milliLiter(s) (50 mL/Hr) IV Continuous <Continuous>  senna 2 Tablet(s) Oral at bedtime    MEDICATIONS  (PRN):  acetaminophen   Tablet .. 650 milliGRAM(s) Oral every 6 hours PRN Temp greater or equal to 38C (100.4F), Mild Pain (1 - 3)  ibuprofen  Tablet. 400 milliGRAM(s) Oral every 6 hours PRN Temp greater or equal to 38.5C (101.3F), Moderate Pain (4 - 6)  oxyCODONE    IR 5 milliGRAM(s) Oral every 4 hours PRN Severe Pain (7 - 10)    LABS:                        14.7   5.94  )-----------( 181      ( 23 Aug 2021 07:29 )             43.7     08-23    142  |  109<H>  |  13  ----------------------------<  85  4.0   |  28  |  0.92    Ca    9.1      23 Aug 2021 07:29    TPro  7.7  /  Alb  3.8  /  TBili  2.2<H>  /  DBili  x   /  AST  160<H>  /  ALT  506<H>  /  AlkPhos  126<H>  08-23    PT/INR - ( 23 Aug 2021 07:29 )   PT: 13.3 sec;   INR: 1.14 ratio      PTT - ( 23 Aug 2021 07:29 )  PTT:33.0 sec    RADIOLOGY & ADDITIONAL STUDIES: no new post-operative labs

## 2021-08-23 NOTE — PROGRESS NOTE ADULT - ASSESSMENT
common bile duct stone  elevated lfts  abnormal CT     ct documented cbd stone  s/p ercp, where multiple small stones were removed from cbd  s/p cholecystectomy  advance diet as per surgery team  monitor lfts  d/c planning per surgery/primary team    Advanced care planning was discussed with patient and family.  Advanced care planning forms were reviewed and discussed.  Risks, benefits and alternatives of gastroenterologic procedures were discussed in detail and all questions were answered.    30 minutes spent.

## 2021-08-23 NOTE — PROGRESS NOTE ADULT - PROBLEM SELECTOR PLAN 2
- continue home synthroid

## 2021-08-23 NOTE — PROGRESS NOTE ADULT - PROBLEM SELECTOR PLAN 1
31y Male POD#0 s/p laparoscopic cholecystectomy. Patient doing well post-operatively, tolerating diet. Vitals stable at present.  - F/up AM labs- trend LFTs  - incentive spirometer  - pain control, supportive care, OOB  - Low fat diet   - continue DVT ppx     Surgical Team Contact Information  Spectralink: Ext: [phone] or [phone]  Pager: [phone]

## 2021-08-23 NOTE — PROGRESS NOTE ADULT - PROBLEM SELECTOR PLAN 1
- Pt presenting with colicky epigastric pain associated with greasy foods, jaundice.   - CT A/P: Cholelithiasis and choledocholithiasis. Mildly dilated common bile duct.  - s/p morphine 4mg IVP x1, zofran 4mg x1, zosyn, NS 1L bolus x1 in ED  - Continue IV zosyn  - trend LFT's -- s/p OR today, tolerated well  - low fat diet -- tolerated well  - pain control with morphine 2mg IVP q4hrs for moderate pain  - continue zofran PRN for nausea  - s/p ERCP, multiple small stones removed -- s/p lap ellen today, POD#0, on FLD -- DC planning tomorrow

## 2021-08-23 NOTE — PROGRESS NOTE ADULT - SUBJECTIVE AND OBJECTIVE BOX
MAUREEN GALLOWAY  MRN-[id number] 31y    GENERAL SURGERY/ DR. JO    NO FEVER, CHILLS, N/V    MEDICATIONS  (STANDING):  levothyroxine 112.5 MICROGram(s) Oral daily  piperacillin/tazobactam IVPB.. 3.375 Gram(s) IV Intermittent every 8 hours  sodium chloride 0.9%. 1000 milliLiter(s) (50 mL/Hr) IV Continuous <Continuous>    MEDICATIONS  (PRN):  benzocaine 15 mG/menthol 3.6 mG (Sugar-Free) Lozenge 1 Lozenge Oral three times a day PRN Sore Throat  HYDROmorphone  Injectable 0.5 milliGRAM(s) IV Push every 15 minutes PRN Severe Pain (7 - 10)  melatonin 3 milliGRAM(s) Oral at bedtime PRN Insomnia  morphine  - Injectable 2 milliGRAM(s) IV Push every 4 hours PRN Moderate Pain (4 - 6)  ondansetron Injectable 4 milliGRAM(s) IV Push every 8 hours PRN Nausea and/or Vomiting  ondansetron Injectable 4 milliGRAM(s) IV Push once PRN Nausea and/or Vomiting    Vital Signs Last 24 Hrs  T(C): 36.5 (23 Aug 2021 05:51), Max: 36.5 (22 Aug 2021 21:41)  T(F): 97.7 (23 Aug 2021 05:51), Max: 97.7 (22 Aug 2021 21:41)  HR: 54 (23 Aug 2021 05:51) (50 - 54)  BP: 118/83 (23 Aug 2021 05:51) (113/73 - 118/83)  RR: 16 (23 Aug 2021 05:51) (16 - 16)  SpO2: 97% (23 Aug 2021 05:51) (97% - 98%)    08-22-21 @ 07:01  -  08-23-21 @ 07:00  --------------------------------------------------------  IN: 450 mL / OUT: 0 mL / NET: 450 mL      LUNGS: CLEAR TO AUSCULTATION , NO W/R/R  ABDOMEN: + BS, SOFT, NON DISTENDED, NO PALPABLE MASS, NON TENDER TO PALPATION   EXTREMITY: NO EDEMA, NO CALF TENDERNESS                            14.2   5.53  )-----------( 168      ( 22 Aug 2021 08:58 )             41.9      08-22    141  |  110<H>  |  12  ----------------------------<  86  3.9   |  28  |  1.00    Ca    8.9      22 Aug 2021 08:58    TPro  7.5  /  Alb  3.7  /  TBili  2.0<H>  /  DBili  x   /  AST  265<H>  /  ALT  558<H>  /  AlkPhos  130<H>  08-22    COVID-19 PCR: NotDetec (22 Aug 2021 18:02)                           ASSESSMENT &  PLAN:      CHOLELITHIASIS  CHOLEDOCHOLITHIASES S/P ERCP 8/19    NPO  ZOSYN  F/U AM LABS  TO OR FOR LAPAROSCOPIC CHOLECYSTECTOMY  SURGICAL TEAM WILL FOLLOW UP

## 2021-08-23 NOTE — PROGRESS NOTE ADULT - PROBLEM SELECTOR PROBLEM 1
Choledocholithiasis

## 2021-08-23 NOTE — PROGRESS NOTE ADULT - SUBJECTIVE AND OBJECTIVE BOX
[facility]  Isacc Cason PA-C  [facility] [place] [address]  [phone]      INTERVAL HPI/OVERNIGHT EVENTS:  Pt s/e s/p ERCP  s/p cholecystectomy ()  Pt states he feels well, denies pain, nausea, or further GI complaints    MEDICATIONS  (STANDING):  levothyroxine 112.5 MICROGram(s) Oral daily  piperacillin/tazobactam IVPB.. 3.375 Gram(s) IV Intermittent every 8 hours  sodium chloride 0.9%. 1000 milliLiter(s) (125 mL/Hr) IV Continuous <Continuous>    MEDICATIONS  (PRN):  benzocaine 15 mG/menthol 3.6 mG (Sugar-Free) Lozenge 1 Lozenge Oral three times a day PRN Sore Throat  HYDROmorphone  Injectable 0.5 milliGRAM(s) IV Push every 15 minutes PRN Severe Pain (7 - 10)  melatonin 3 milliGRAM(s) Oral at bedtime PRN Insomnia  morphine  - Injectable 2 milliGRAM(s) IV Push every 4 hours PRN Moderate Pain (4 - 6)  ondansetron Injectable 4 milliGRAM(s) IV Push every 8 hours PRN Nausea and/or Vomiting  ondansetron Injectable 4 milliGRAM(s) IV Push once PRN Nausea and/or Vomiting      Allergies    No Known Allergies    Intolerances        ROS:   General:  No wt loss, fevers, chills, night sweats, fatigue,   Eyes:  Good vision, no reported pain  ENT:  No sore throat, pain, runny nose, dysphagia  CV:  No pain, palpitations, hypo/hypertension  Resp:  No dyspnea, cough, tachypnea, wheezing  GI:  No pain, No nausea, No vomiting, No diarrhea, No constipation, No weight loss, No fever, No pruritis, No rectal bleeding, No tarry stools, No dysphagia,  :  No pain, bleeding, incontinence, nocturia  Muscle:  No pain, weakness  Neuro:  No weakness, tingling, memory problems  Psych:  No fatigue, insomnia, mood problems, depression  Endocrine:  No polyuria, polydipsia, cold/heat intolerance  Heme:  No petechiae, ecchymosis, easy bruisability  Skin:  No rash, tattoos, scars, edema      PHYSICAL EXAM:   Vital Signs:  Vital Signs Last 24 Hrs  T(C): 36.7 (20 Aug 2021 05:43), Max: 36.9 (19 Aug 2021 21:01)  T(F): 98 (20 Aug 2021 05:43), Max: 98.4 (19 Aug 2021 21:01)  HR: 93 (20 Aug 2021 05:43) (45 - 93)  BP: 100/61 (20 Aug 2021 05:43) (100/61 - 127/73)  BP(mean): --  RR: 16 (20 Aug 2021 05:43) (14 - 16)  SpO2: 98% (20 Aug 2021 05:43) (95% - 99%)  Daily     Daily     GENERAL:  Appears stated age,   HEENT:  NC/AT,    CHEST:  Full & symmetric excursion,   HEART:  Regular rhythm,  ABDOMEN:  Soft, non-tender, non-distended,  EXTEREMITIES:  no cyanosis  SKIN:  No rash  NEURO:  Alert,       LABS:                        12.8   5.07  )-----------( 159      ( 20 Aug 2021 08:15 )             38.0     08-20    144  |  112<H>  |  7   ----------------------------<  95  4.1   |  29  |  0.96    Ca    8.2<L>      20 Aug 2021 08:15  Phos  2.5     08-20  Mg     2.1     08-20    TPro  6.1  /  Alb  3.0<L>  /  TBili  3.6<H>  /  DBili  x   /  AST  79<H>  /  ALT  386<H>  /  AlkPhos  127<H>  08-20    PT/INR - ( 18 Aug 2021 15:52 )   PT: 14.2 sec;   INR: 1.23 ratio         PTT - ( 18 Aug 2021 15:52 )  PTT:33.5 sec  Urinalysis Basic - ( 18 Aug 2021 16:27 )    Color: Nahed / Appearance: Slightly Turbid / S.020 / pH: x  Gluc: x / Ketone: Trace  / Bili: Large / Urobili: 8   Blood: x / Protein: 15 / Nitrite: Positive   Leuk Esterase: Trace / RBC: 3-5 /HPF / WBC 3-5   Sq Epi: x / Non Sq Epi: x / Bacteria: Few        RADIOLOGY & ADDITIONAL TESTS:

## 2021-08-24 ENCOUNTER — TRANSCRIPTION ENCOUNTER (OUTPATIENT)
Age: 31
End: 2021-08-24

## 2021-08-24 VITALS
RESPIRATION RATE: 16 BRPM | SYSTOLIC BLOOD PRESSURE: 151 MMHG | HEART RATE: 76 BPM | OXYGEN SATURATION: 98 % | TEMPERATURE: 98 F | DIASTOLIC BLOOD PRESSURE: 96 MMHG

## 2021-08-24 LAB
ALBUMIN SERPL ELPH-MCNC: 3.7 G/DL — SIGNIFICANT CHANGE UP (ref 3.3–5)
ALP SERPL-CCNC: 112 U/L — SIGNIFICANT CHANGE UP (ref 40–120)
ALT FLD-CCNC: 352 U/L — HIGH (ref 12–78)
ANION GAP SERPL CALC-SCNC: 8 MMOL/L — SIGNIFICANT CHANGE UP (ref 5–17)
AST SERPL-CCNC: 69 U/L — HIGH (ref 15–37)
BILIRUB SERPL-MCNC: 2.2 MG/DL — HIGH (ref 0.2–1.2)
BUN SERPL-MCNC: 10 MG/DL — SIGNIFICANT CHANGE UP (ref 7–23)
CALCIUM SERPL-MCNC: 9.2 MG/DL — SIGNIFICANT CHANGE UP (ref 8.5–10.1)
CHLORIDE SERPL-SCNC: 106 MMOL/L — SIGNIFICANT CHANGE UP (ref 96–108)
CO2 SERPL-SCNC: 27 MMOL/L — SIGNIFICANT CHANGE UP (ref 22–31)
CREAT SERPL-MCNC: 0.73 MG/DL — SIGNIFICANT CHANGE UP (ref 0.5–1.3)
GLUCOSE SERPL-MCNC: 83 MG/DL — SIGNIFICANT CHANGE UP (ref 70–99)
HCT VFR BLD CALC: 40.8 % — SIGNIFICANT CHANGE UP (ref 39–50)
HGB BLD-MCNC: 14.1 G/DL — SIGNIFICANT CHANGE UP (ref 13–17)
MCHC RBC-ENTMCNC: 29.7 PG — SIGNIFICANT CHANGE UP (ref 27–34)
MCHC RBC-ENTMCNC: 34.6 GM/DL — SIGNIFICANT CHANGE UP (ref 32–36)
MCV RBC AUTO: 86.1 FL — SIGNIFICANT CHANGE UP (ref 80–100)
NRBC # BLD: 0 /100 WBCS — SIGNIFICANT CHANGE UP (ref 0–0)
PLATELET # BLD AUTO: 183 K/UL — SIGNIFICANT CHANGE UP (ref 150–400)
POTASSIUM SERPL-MCNC: 3.7 MMOL/L — SIGNIFICANT CHANGE UP (ref 3.5–5.3)
POTASSIUM SERPL-SCNC: 3.7 MMOL/L — SIGNIFICANT CHANGE UP (ref 3.5–5.3)
PROT SERPL-MCNC: 7.4 G/DL — SIGNIFICANT CHANGE UP (ref 6–8.3)
RBC # BLD: 4.74 M/UL — SIGNIFICANT CHANGE UP (ref 4.2–5.8)
RBC # FLD: 11.9 % — SIGNIFICANT CHANGE UP (ref 10.3–14.5)
SODIUM SERPL-SCNC: 141 MMOL/L — SIGNIFICANT CHANGE UP (ref 135–145)
WBC # BLD: 10.03 K/UL — SIGNIFICANT CHANGE UP (ref 3.8–10.5)
WBC # FLD AUTO: 10.03 K/UL — SIGNIFICANT CHANGE UP (ref 3.8–10.5)

## 2021-08-24 PROCEDURE — 85610 PROTHROMBIN TIME: CPT

## 2021-08-24 PROCEDURE — 93005 ELECTROCARDIOGRAM TRACING: CPT

## 2021-08-24 PROCEDURE — 85025 COMPLETE CBC W/AUTO DIFF WBC: CPT

## 2021-08-24 PROCEDURE — 80076 HEPATIC FUNCTION PANEL: CPT

## 2021-08-24 PROCEDURE — 83690 ASSAY OF LIPASE: CPT

## 2021-08-24 PROCEDURE — 85730 THROMBOPLASTIN TIME PARTIAL: CPT

## 2021-08-24 PROCEDURE — U0003: CPT

## 2021-08-24 PROCEDURE — 36415 COLL VENOUS BLD VENIPUNCTURE: CPT

## 2021-08-24 PROCEDURE — C1769: CPT

## 2021-08-24 PROCEDURE — 86900 BLOOD TYPING SEROLOGIC ABO: CPT

## 2021-08-24 PROCEDURE — 86850 RBC ANTIBODY SCREEN: CPT

## 2021-08-24 PROCEDURE — 96375 TX/PRO/DX INJ NEW DRUG ADDON: CPT

## 2021-08-24 PROCEDURE — 86769 SARS-COV-2 COVID-19 ANTIBODY: CPT

## 2021-08-24 PROCEDURE — 99285 EMERGENCY DEPT VISIT HI MDM: CPT | Mod: 25

## 2021-08-24 PROCEDURE — 86901 BLOOD TYPING SEROLOGIC RH(D): CPT

## 2021-08-24 PROCEDURE — 76000 FLUOROSCOPY <1 HR PHYS/QHP: CPT

## 2021-08-24 PROCEDURE — 82248 BILIRUBIN DIRECT: CPT

## 2021-08-24 PROCEDURE — 80053 COMPREHEN METABOLIC PANEL: CPT

## 2021-08-24 PROCEDURE — 81001 URINALYSIS AUTO W/SCOPE: CPT

## 2021-08-24 PROCEDURE — 82150 ASSAY OF AMYLASE: CPT

## 2021-08-24 PROCEDURE — 80307 DRUG TEST PRSMV CHEM ANLYZR: CPT

## 2021-08-24 PROCEDURE — 80048 BASIC METABOLIC PNL TOTAL CA: CPT

## 2021-08-24 PROCEDURE — 84100 ASSAY OF PHOSPHORUS: CPT

## 2021-08-24 PROCEDURE — 82247 BILIRUBIN TOTAL: CPT

## 2021-08-24 PROCEDURE — 83735 ASSAY OF MAGNESIUM: CPT

## 2021-08-24 PROCEDURE — 96365 THER/PROPH/DIAG IV INF INIT: CPT

## 2021-08-24 PROCEDURE — 85027 COMPLETE CBC AUTOMATED: CPT

## 2021-08-24 PROCEDURE — 80074 ACUTE HEPATITIS PANEL: CPT

## 2021-08-24 PROCEDURE — 99239 HOSP IP/OBS DSCHRG MGMT >30: CPT

## 2021-08-24 PROCEDURE — 74177 CT ABD & PELVIS W/CONTRAST: CPT | Mod: MA

## 2021-08-24 PROCEDURE — 87086 URINE CULTURE/COLONY COUNT: CPT

## 2021-08-24 PROCEDURE — U0005: CPT

## 2021-08-24 PROCEDURE — C1889: CPT

## 2021-08-24 PROCEDURE — 88304 TISSUE EXAM BY PATHOLOGIST: CPT

## 2021-08-24 PROCEDURE — 74181 MRI ABDOMEN W/O CONTRAST: CPT

## 2021-08-24 RX ADMIN — Medication 112 MICROGRAM(S): at 05:46

## 2021-08-24 NOTE — PROGRESS NOTE ADULT - REASON FOR ADMISSION
Choledocholithiasis

## 2021-08-24 NOTE — DISCHARGE NOTE NURSING/CASE MANAGEMENT/SOCIAL WORK - PATIENT PORTAL LINK FT
You can access the FollowMyHealth Patient Portal offered by [facility] by registering at the following website: [email or web address]. By joining [employer]’s FollowMyHealth portal, you will also be able to view your health information using other applications (apps) compatible with our system.

## 2021-08-24 NOTE — DISCHARGE NOTE PROVIDER - NSDCCPCAREPLAN_GEN_ALL_CORE_FT
PRINCIPAL DISCHARGE DIAGNOSIS  Diagnosis: Choledocholithiasis  Assessment and Plan of Treatment: Pt admitted for cholelithiasis w/ CBD stones, had ERCP w/ multiple small stones removed, followed by lap cholecystectomy on 8/23, tolerated well. LFTs and jaundice improved. Pt seen and examined on day of discharge and is clinically optimized to return to  home  with recommendations to follow up with PMD and listed specialists within one week.      SECONDARY DISCHARGE DIAGNOSES  Diagnosis: Hypothyroidism  Assessment and Plan of Treatment: Take synthroid as prescribed

## 2021-08-24 NOTE — DISCHARGE NOTE NURSING/CASE MANAGEMENT/SOCIAL WORK - NSDCPNINST_GEN_ALL_CORE
no heavy lifting. Nausea, vomiting or fever and worsening pain call Dr Mohan . Chest pain or shortness of breath call [phone]

## 2021-08-24 NOTE — DISCHARGE NOTE PROVIDER - HOSPITAL COURSE
FROM ADMISSION H+P:   HPI:  Patient is a 31 year old male with PMH of hypothyroidism who presents to the ED with abdominal pain. Patient describes pain as colicky epigastric pain associated with eating greasy foods. Pain originally started 2 months ago and has been present on and off since then. He believed symptoms were due to gas and was taking gas-x for relief. Since Sunday, pain has been radiating to his back. For the past 2 days pain has been progressively worsening.  This morning mother noticed patient looked very jaundiced and brought him to Urgent care today for evaluation and then referred to ED. He denies any fever, chills, cp, dyspnea, leg pain/swelling. Of note he noticed his urine was much darker yesterday but denies any frequency or dysuria.   No past hx of DM2/HTN/HLD/CAD/CHF.     In the ED  VS:T98, Hr 51, /91, RR 18, 97% SPO2 on RA  Labs: CBC WNL, INR 1.23, Tbili 10.2, Dbili 3.4, Alk phos 168, AST//867. UA: +nitrite, trace LE, few bacteria  CT A/P: Cholelithiasis and choledocholithiasis. Mildly dilated common bile duct.  EKG: Pending  Given in ED: morphine 4mg IVP x1, zofran 4mg x1, zosyn, NS 1L bolus x1 (18 Aug 2021 19:22)      ---  HOSPITAL COURSE: Pt admitted for cholelithiasis w/ CBD stones, had ERCP w/ multiple small stones removed, followed by lap cholecystectomy on 8/23, tolerated well. LFTs and jaundice improved. Pt seen and examined on day of discharge and is clinically optimized to return to  home  with recommendations to follow up with PMD and listed specialists within one week.     T(C): 36.7 (08-24-21 @ 05:51), Max: 36.9 (08-23-21 @ 11:15)  HR: 60 (08-24-21 @ 05:51) (48 - 88)  BP: 122/81 (08-24-21 @ 05:51) (115/70 - 151/85)  RR: 16 (08-24-21 @ 05:51) (14 - 16)  SpO2: 97% (08-24-21 @ 05:51) (94% - 98%)    Physical Exam:  General: Well developed, well nourished, NAD  HEENT: NC/AT, PERRLA, EOMI B/L, moist mucous membranes   Neck: Supple, nontender, no masses  CV: RRR, +S1/S2, no murmurs, rubs or gallops  Respiratory: CTA B/L, No W/R/R  Abdominal: Soft, NT, ND +BSx4. incision sites c/d/i  Extremities: No C/C/E, + peripheral pulses  Neurology: AAOx3, nonfocal      ---  CONSULTANTS:   Surgery: Dr. Kaplan  ---  TIME SPENT:  I, the attending physician, was physically present for the key portions of the evaluation and management (E/M) service provided. The total amount of time spent reviewing the hospital notes, laboratory values, imaging findings, assessing/counseling the patient, discussing with consultant physicians, social work, nursing staff was 33 minutes    ---  Primary care provider was made aware of plan for discharge:      [  ] NO     [ x ] YES

## 2021-08-24 NOTE — DISCHARGE NOTE PROVIDER - CARE PROVIDER_API CALL
Jaguar Kaplan)  Surgery  [address] [place] [address]  Phone: [phone]  Fax: [phone]  Follow Up Time:

## 2021-08-24 NOTE — PROGRESS NOTE ADULT - SUBJECTIVE AND OBJECTIVE BOX
MAUREEN GALLOWAY  MRN-[id number] 31y    GENERAL SURGERY/ DR. JO    NO FEVER, CHILLS, N/V  TOLERATING REGULAR DIET  + FLATUS, NO BM, VOIDING     MEDICATIONS  (STANDING):  enoxaparin Injectable 40 milliGRAM(s) SubCutaneous daily  lactated ringers. 1000 milliLiter(s) (50 mL/Hr) IV Continuous <Continuous>  levothyroxine 112 MICROGram(s) Oral daily  pantoprazole  Injectable 40 milliGRAM(s) IV Push daily  senna 2 Tablet(s) Oral at bedtime    MEDICATIONS  (PRN):  acetaminophen   Tablet .. 650 milliGRAM(s) Oral every 6 hours PRN Temp greater or equal to 38C (100.4F), Mild Pain (1 - 3)  ibuprofen  Tablet. 400 milliGRAM(s) Oral every 6 hours PRN Temp greater or equal to 38.5C (101.3F), Moderate Pain (4 - 6)  oxyCODONE    IR 5 milliGRAM(s) Oral every 4 hours PRN Severe Pain (7 - 10)    Vital Signs Last 24 Hrs  T(C): 36.7 (24 Aug 2021 05:51), Max: 36.9 (23 Aug 2021 11:15)  T(F): 98 (24 Aug 2021 05:51), Max: 98.4 (23 Aug 2021 11:15)  HR: 60 (24 Aug 2021 05:51) (48 - 88)  BP: 122/81 (24 Aug 2021 05:51) (115/70 - 151/85)  RR: 16 (24 Aug 2021 05:51) (14 - 16)  SpO2: 97% (24 Aug 2021 05:51) (94% - 98%)    08-23-21 @ 07:01  -  08-24-21 @ 07:00  --------------------------------------------------------  IN: 1300 mL / OUT: 300 mL / NET: 1000 mL    POD # 1     LUNGS: CLEAR TO AUSCULTATION , NO W/R/R  ABDOMEN: UMBILICAL, EPIGASTRIC , X2 RIGHT TROCAR SITE ALL DRY AND INTACT. + BS, SOFT, NON DISTENDED, NON  TENDER    EXTREMITY: NO EDEMA, NO CALF TENDERNESS                            14.1   10.03 )-----------( 183      ( 24 Aug 2021 07:47 )             40.8      08-24    141  |  106  |  10  ----------------------------<  83  3.7   |  27  |  0.73    Ca    9.2      24 Aug 2021 07:47    TPro  7.4  /  Alb  3.7  /  TBili  2.2<H>  /  DBili  x   /  AST  69<H>  /  ALT  352<H>  /  AlkPhos  112  08-24                           ASSESSMENT &  PLAN:      S/P LAPAROSCOPIC CHOLECYSTECTOMY  CHOLEDOCHOLITHIASES S/P ERCP 8/19    REGULAR DIET   AM LABS NOTED   CLEAR FROM SURGICAL STAND POINT FOR DISCHARGE  FOLLOW UP WITH DR. JO, GI AND PCP WITH REPEAT LFTs IN ONE WEEK

## 2021-08-24 NOTE — DISCHARGE NOTE NURSING/CASE MANAGEMENT/SOCIAL WORK - NSDCPEFALRISK_GEN_ALL_CORE
For information on Fall & injury Prevention, visit [email or web address]

## 2021-08-24 NOTE — PROGRESS NOTE ADULT - PROVIDER SPECIALTY LIST ADULT
Gastroenterology
Gastroenterology
Surgery
Gastroenterology
Gastroenterology
Surgery
Gastroenterology
Surgery
Hospitalist
Surgery
Hospitalist
Surgery
Hospitalist
Surgery
Hospitalist
Hospitalist

## 2021-08-30 PROBLEM — E03.9 HYPOTHYROIDISM, UNSPECIFIED: Chronic | Status: ACTIVE | Noted: 2021-08-18

## 2021-09-02 ENCOUNTER — APPOINTMENT (OUTPATIENT)
Dept: SURGERY | Facility: CLINIC | Age: 31
End: 2021-09-02
Payer: MEDICAID

## 2021-09-02 VITALS
OXYGEN SATURATION: 97 % | WEIGHT: 184 LBS | HEIGHT: 69 IN | DIASTOLIC BLOOD PRESSURE: 90 MMHG | HEART RATE: 69 BPM | BODY MASS INDEX: 27.25 KG/M2 | SYSTOLIC BLOOD PRESSURE: 130 MMHG

## 2021-09-02 PROBLEM — Z00.00 ENCOUNTER FOR PREVENTIVE HEALTH EXAMINATION: Status: ACTIVE | Noted: 2021-09-02

## 2021-09-02 PROCEDURE — 99024 POSTOP FOLLOW-UP VISIT: CPT

## 2022-11-22 NOTE — ASU PREOP CHECKLIST - NOTHING BY MOUTH SINCE
Price (Use Numbers Only, No Special Characters Or $): 606 Price (Use Numbers Only, No Special Characters Or $): 066 18-Aug-2021 13:30

## 2022-11-28 NOTE — ED PROVIDER NOTE - ADMIT DISPOSITION PRESENT ON ADMISSION SEPSIS
"Physical Therapy Treatment    Patient Name:  Marianela Shrestha   MRN:  [id number]  Admit Date: 11/18/2022  Admitting Diagnosis: SHAZIA (cauda equina syndrome)    General Precautions: Standard, fall   Orthopedic Precautions:spinal precautions   Braces: AFO     Recommendations:     Discharge Recommendations:  home health PT   Level of Assistance Recommended at Discharge: Intermittent assistance   Discharge Equipment Recommendations: bedside commode, walker, rolling, wheelchair, bath bench   Barriers to discharge: Decreased caregiver support (increased assistance needed)    Assessment:     Marianela Shrestha is a 40 y.o. female admitted with a medical diagnosis of SHAZIA (cauda equina syndrome). Pt tolerated session well with no adverse effects noted. Pt continues to require education and cuing for spinal precaution compliance, but has good safety awareness and is conscious of precautions. Pt continues to improve with functional mobility and level of assistance required. Pt will continue to benefit from skilled therapy services to improve LE strength and endurance to continue to improve functional mobility and reach highest levels of independence.      Performance deficits affecting function:  impaired endurance, weakness, impaired sensation, impaired self care skills, impaired functional mobility, gait instability, impaired balance, decreased coordination, decreased lower extremity function, pain, abnormal tone, decreased ROM, impaired coordination, impaired fine motor, impaired skin, impaired cardiopulmonary response to activity, orthopedic precautions .    Rehab Potential is good    Activity Tolerance: Good    Plan:     Patient to be seen 6 x/week to address the above listed problems via gait training, therapeutic activities, therapeutic exercises, neuromuscular re-education, wheelchair management/training    Plan of Care Expires: 12/19/22  Plan of Care Reviewed with: patient    Subjective     "I just need to put my " "clothes back on."     Pain/Comfort:  Pain Rating 1: 5/10  Location - Side 1: Bilateral  Location - Orientation 1: generalized  Location 1: lumbar spine  Pain Addressed 1: Pre-medicate for activity, Reposition, Distraction  Pain Rating Post-Intervention 1: 5/10    Patient's cultural, spiritual, Hindu conflicts given the current situation:  no    Objective:     Patient found supine with  (L AFO) upon PTA entry to room.     Therapeutic Activities and Exercises:   Recumbent cross  lvl 2 x 10 minutes  Education for improved technique with mobility in room to better adhere to spinal precautions    Functional Mobility:  Bed Mobility:     Supine to Sit: supervision  Sit to Supine: supervision  Transfers:     Sit to Stand:  stand by assistance with rolling walker  Bed to Chair: stand by assistance with  no AD  using  Squat Pivot  Gait: pt ambulated 225' with RW and CGA  Pt demonstrates improved posture and reduced crossing over when stepping; no LOB or instability noted, but still significant reliance on RW  Stairs:  Pt ascended/descended 8 stair(s) with No Assistive Device with bilateral handrails with Contact Guard Assistance.   4" curb step: pt ascended/descended 4" curb with RW and CGA no LOB noted, but assistance needed for RW management on descent  Wheelchair Propulsion:  Pt propelled Standard wheelchair x ~100 feet on Level tile with  Bilateral upper extremity with Supervision or Set-up Assistance.     AM-PAC 6 CLICK MOBILITY  22    Patient left HOB elevated with all lines intact, call button in reach, and spouse present.    GOALS:   Multidisciplinary Problems       Physical Therapy Goals          Problem: Physical Therapy    Goal Priority Disciplines Outcome Goal Variances Interventions   Physical Therapy Goal     PT, PT/OT Ongoing, Progressing     Description: Goals to be met by:12/19/22    Patient will increase functional independence with mobility by performing:    Sit to stand transfer with " Supervision.  Bed to chair transfer with Supervision using Rolling Walker.  Gait  x 150 feet with Stand-by Assistance using Rolling Walker.   Ascend/descend 4 stair with bilateral Handrails Stand-by Assistance using No Assistive Device.   Ascend/Descend 4 inch curb step with Stand-by Assistance using Rolling Walker.                         Time Tracking:     PT Received On: 11/28/22  PT Start Time: 1142  PT Stop Time: 1221  PT Total Time (min): 39 min    Billable Minutes: Gait Training 15, Therapeutic Activity 12, and Therapeutic Exercise 12    Treatment Type: Treatment  PT/PTA: PTA     PTA Visit Number: 1     11/28/2022   No

## 2023-05-08 NOTE — ED ADULT TRIAGE NOTE - BMI (KG/M2)
overnight or 5 pounds in a week, increased swelling in our hands or feet or shortness of breath while lying flat in bed. Please call your doctor as soon as you notice any of these symptoms; do not wait until your next office visit. The discharge information has been reviewed with the patient. The patient verbalized understanding. Discharge medications reviewed with the patient and appropriate educational materials and side effects teaching were provided.   ___________________________________________________________________________________________________________________________________ 27.3